# Patient Record
Sex: MALE | Race: WHITE | NOT HISPANIC OR LATINO | Employment: STUDENT | URBAN - METROPOLITAN AREA
[De-identification: names, ages, dates, MRNs, and addresses within clinical notes are randomized per-mention and may not be internally consistent; named-entity substitution may affect disease eponyms.]

---

## 2024-01-16 ENCOUNTER — TELEMEDICINE (OUTPATIENT)
Dept: BEHAVIORAL/MENTAL HEALTH CLINIC | Facility: CLINIC | Age: 19
End: 2024-01-16
Payer: COMMERCIAL

## 2024-01-16 DIAGNOSIS — F43.22 ADJUSTMENT DISORDER WITH ANXIOUS MOOD: Primary | ICD-10-CM

## 2024-01-16 PROCEDURE — 90834 PSYTX W PT 45 MINUTES: CPT | Performed by: COUNSELOR

## 2024-01-22 NOTE — PSYCH
"Behavioral Health Psychotherapy Progress Note    Psychotherapy Provided: Individual Psychotherapy     1. Adjustment disorder with anxious mood            Goals addressed in session: Goal 1     DATA: Client shared that their sleep schedule has been off. Client shared that they have continued to spend time with their parents, and has decided to go on a snowboarding/skiing day trip with their parents, which client's parents initiated. Therapist verbally reinforced client's decision to be open to building a stronger relationship with their parents. Client also shared that their family had had a big family game night.     During this session, this clinician used the following therapeutic modalities: Solution-Focused Therapy and Supportive Psychotherapy    Substance Abuse was not addressed during this session. If the client is diagnosed with a co-occurring substance use disorder, please indicate any changes in the frequency or amount of use: NA. Stage of change for addressing substance use diagnoses: No substance use/Not applicable    ASSESSMENT:  Tommy Colunga presents with a Euthymic/ normal mood.     his affect is Normal range and intensity, which is congruent, with his mood and the content of the session. The client has made progress on their goals.     Tommy Colunga presents with a none risk of suicide, none risk of self-harm, and none risk of harm to others.    For any risk assessment that surpasses a \"low\" rating, a safety plan must be developed.    A safety plan was indicated: no  If yes, describe in detail NA    PLAN: Between sessions, Tommy Colunga will use coping skills. At the next session, the therapist will use Family Therapy and Supportive Psychotherapy to address relationships.    Behavioral Health Treatment Plan and Discharge Planning: Tommy Colunga is aware of and agrees to continue to work on their treatment plan. They have identified and are working toward their discharge goals. yes    Visit start and stop " times:    01/16/24  Start Time: 1502  Stop Time: 1545  Total Visit Time: 43 minutes  Virtual Regular Visit    Verification of patient location:    Patient is located at Home in the following state in which I hold an active license [place]      Assessment/Plan:    Problem List Items Addressed This Visit       Adjustment disorder with anxious mood - Primary       Goals addressed in session: Goal 1          Reason for visit is No chief complaint on file.       Encounter provider JOVI Love    Provider located at [facility] THERAPIST [facility]  [facility] THERAPIST MENTAL HEALTH OUTPATIENT  [address] [facility] [place] [address]  [phone]      Recent Visits  Date Type Provider Dept   01/16/24 Telemedicine JOVI Love [facility] Therapist [facility]   Showing recent visits within past 7 days and meeting all other requirements  Future Appointments  No visits were found meeting these conditions.  Showing future appointments within next 150 days and meeting all other requirements       The patient was identified by name and date of birth. Tommy Colunga was informed that this is a telemedicine visit and that the visit is being conducted throughthe [employer] platform. He agrees to proceed..  My office door was closed. No one else was in the room.  He acknowledged consent and understanding of privacy and security of the video platform. The patient has agreed to participate and understands they can discontinue the visit at any time.    Patient is aware this is a billable service.     Subjective  Tommy Colunga is a 18 y.o. male  .      HPI     No past medical history on file.    No past surgical history on file.    No current outpatient medications on file.     No current facility-administered medications for this visit.        Not on File    Review of Systems    Video Exam    There were no vitals filed for this visit.    Physical Exam     01/16/24  Start Time: 1502  Stop Time: 1545  Total Visit Time: 43  minutes

## 2024-01-25 ENCOUNTER — TELEMEDICINE (OUTPATIENT)
Dept: BEHAVIORAL/MENTAL HEALTH CLINIC | Facility: CLINIC | Age: 19
End: 2024-01-25
Payer: COMMERCIAL

## 2024-01-25 DIAGNOSIS — F43.22 ADJUSTMENT DISORDER WITH ANXIOUS MOOD: Primary | ICD-10-CM

## 2024-01-25 PROCEDURE — 90832 PSYTX W PT 30 MINUTES: CPT | Performed by: COUNSELOR

## 2024-01-25 NOTE — PSYCH
Behavioral Health Psychotherapy Progress Note    Psychotherapy Provided: Individual Psychotherapy     1. Adjustment disorder with anxious mood            Goals addressed in session: Goal 1     DATA: Client shared that she had come out to her parents as a trans woman via a letter she had written several months ago but had been anxious to give it to them. Client shared that she gave the letter to her parents and then ran back to her room until they came to find her. Client said that her parents hugged her and then the 3 of them had a conversation about the letter. Client shared that she hid under her blanket during it, which client has done previously in therapy when she is discussing a very anxiety-producing topic. Therapist and client celebrated client's decision to tell her parents that she is a trans woman. Client then gave therapist permission to change her name on EP[employer] and to use she/her in therapist's notes. Client shared how her first day of classes went today, and how her time has been going since arriving back on campus. Therapist and client ended session slightly early due to client saying she had nothing further to talk about.    During this session, this clinician used the following therapeutic modalities: Family Therapy, Gender Affirmation Therapy, and Supportive Psychotherapy    Substance Abuse was not addressed during this session. If the client is diagnosed with a co-occurring substance use disorder, please indicate any changes in the frequency or amount of use: NA. Stage of change for addressing substance use diagnoses: No substance use/Not applicable    ASSESSMENT:  Eladia Colunga presents with a Euthymic/ normal mood.     her affect is Normal range and intensity, which is congruent, with her mood and the content of the session. The client has made progress on their goals.     Eladia Colunga presents with a none risk of suicide, none risk of self-harm, and none risk of harm to others.    For any risk  "assessment that surpasses a \"low\" rating, a safety plan must be developed.    A safety plan was indicated: no  If yes, describe in detail NA    PLAN: Between sessions, Eladia Colunga will continue to connect with parents while away at college. At the next session, the therapist will use Gender Affirmation Therapy to address identity.    Behavioral Health Treatment Plan and Discharge Planning: Eladia Colunga is aware of and agrees to continue to work on their treatment plan. They have identified and are working toward their discharge goals. yes    Visit start and stop times:    01/25/24  Start Time: 1202  Stop Time: 1232  Total Visit Time: 30 minutes  Virtual Regular Visit    Verification of patient location:    Patient is located at Other in the following state in which I hold an active license [place]      Assessment/Plan:    Problem List Items Addressed This Visit       Adjustment disorder with anxious mood - Primary       Goals addressed in session: Goal 1          Reason for visit is No chief complaint on file.       Encounter provider JOVI Love    Provider located at [facility] THERAPIST OP  [facility] THERAPIST MENTAL HEALTH OUTPATIENT  [address] [place] [id number] [phone]      Recent Visits  No visits were found meeting these conditions.  Showing recent visits within past 7 days and meeting all other requirements  Today's Visits  Date Type Provider Dept   01/25/24 Telemedicine JOVI Love [facility] Therapist [facility]   Showing today's visits and meeting all other requirements  Future Appointments  No visits were found meeting these conditions.  Showing future appointments within next 150 days and meeting all other requirements       The patient was identified by name and date of birth. Eladia Colunga was informed that this is a telemedicine visit and that the visit is being conducted throughthe [employer] platform. She agrees to proceed..  My office door was closed. " No one else was in the room.  She acknowledged consent and understanding of privacy and security of the video platform. The patient has agreed to participate and understands they can discontinue the visit at any time.    Patient is aware this is a billable service.     Subjective  Eladia Colunga is a 18 y.o. adult, client goes by Eladia and uses she/her pronouns .      HPI     No past medical history on file.    No past surgical history on file.    No current outpatient medications on file.     No current facility-administered medications for this visit.        Not on File    Review of Systems    Video Exam    There were no vitals filed for this visit.    Physical Exam     01/25/24  Start Time: 1202  Stop Time: 1232  Total Visit Time: 30 minutes

## 2024-01-30 PROBLEM — Z91.199 NO-SHOW FOR APPOINTMENT: Status: ACTIVE | Noted: 2024-01-30

## 2024-02-14 ENCOUNTER — TELEMEDICINE (OUTPATIENT)
Dept: BEHAVIORAL/MENTAL HEALTH CLINIC | Facility: CLINIC | Age: 19
End: 2024-02-14
Payer: COMMERCIAL

## 2024-02-14 DIAGNOSIS — F43.22 ADJUSTMENT DISORDER WITH ANXIOUS MOOD: Primary | ICD-10-CM

## 2024-02-14 PROCEDURE — 90832 PSYTX W PT 30 MINUTES: CPT | Performed by: COUNSELOR

## 2024-02-19 NOTE — PSYCH
"Behavioral Health Psychotherapy Progress Note    Psychotherapy Provided: Individual Psychotherapy     1. Adjustment disorder with anxious mood            Goals addressed in session: Goal 1     DATA: Therapist and client discussed the importance of confirming appointments. Therapist and client discussed how client's new classes are going. Client shared that 2 of her close friends recently ended their relationship, which has caused some difficulties for client. Client shared that she does not want to room with her current roommate again next year, but is not sure what her other options are. Client shared that her parents recently came to visit, which had gone well. Therapist and client continued to discuss and process client's relationship with her parents.    During this session, this clinician used the following therapeutic modalities: Cognitive Behavioral Therapy, Family Therapy, Solution-Focused Therapy, and Supportive Psychotherapy    Substance Abuse was not addressed during this session. If the client is diagnosed with a co-occurring substance use disorder, please indicate any changes in the frequency or amount of use: NA. Stage of change for addressing substance use diagnoses: No substance use/Not applicable    ASSESSMENT:  Eladia Colunga presents with a Euthymic/ normal mood.     her affect is Normal range and intensity, which is congruent, with her mood and the content of the session. The client has made progress on their goals.     Eladia Colunga presents with a none risk of suicide, none risk of self-harm, and none risk of harm to others.    For any risk assessment that surpasses a \"low\" rating, a safety plan must be developed.    A safety plan was indicated: no  If yes, describe in detail NA    PLAN: Between sessions, Eladia Colunga will use her coping skills. At the next session, the therapist will use Cognitive Behavioral Therapy to address anxiety.    Behavioral Health Treatment Plan and Discharge Planning: " Eladia Colunga is aware of and agrees to continue to work on their treatment plan. They have identified and are working toward their discharge goals. yes    Visit start and stop times:    02/14/24  Start Time: 1208  Stop Time: 1245  Total Visit Time: 37 minutes  Virtual Regular Visit    Verification of patient location:    Patient is located at Home in the following state in which I hold an active license [place]      Assessment/Plan:    Problem List Items Addressed This Visit       Adjustment disorder with anxious mood - Primary       Goals addressed in session: Goal 1          Reason for visit is No chief complaint on file.       Encounter provider JOVI Love    Provider located at [facility] THERAPIST [facility]OP  [facility] THERAPIST MENTAL HEALTH OUTPATIENT  [address] [facility] [address] [phone]      Recent Visits  Date Type Provider Dept   02/14/24 Telemedicine JOVI Love [facility] Therapist [facility]   Showing recent visits within past 7 days and meeting all other requirements  Future Appointments  No visits were found meeting these conditions.  Showing future appointments within next 150 days and meeting all other requirements       The patient was identified by name and date of birth. Eladia Colunga was informed that this is a telemedicine visit and that the visit is being conducted throughthe [employer] platform. She agrees to proceed..  My office door was closed. No one else was in the room.  She acknowledged consent and understanding of privacy and security of the video platform. The patient has agreed to participate and understands they can discontinue the visit at any time.    Patient is aware this is a billable service.     Subjective  Eladia Colunga is a 18 y.o. adult  .      HPI     No past medical history on file.    No past surgical history on file.    No current outpatient medications on file.     No current facility-administered medications for this visit.         Not on File    Review of Systems    Video Exam    There were no vitals filed for this visit.    Physical Exam     02/14/24  Start Time: 1208  Stop Time: 1245  Total Visit Time: 37 minutes

## 2024-02-27 ENCOUNTER — TELEMEDICINE (OUTPATIENT)
Dept: BEHAVIORAL/MENTAL HEALTH CLINIC | Facility: CLINIC | Age: 19
End: 2024-02-27
Payer: COMMERCIAL

## 2024-02-27 DIAGNOSIS — F43.22 ADJUSTMENT DISORDER WITH ANXIOUS MOOD: Primary | ICD-10-CM

## 2024-02-27 PROCEDURE — 90834 PSYTX W PT 45 MINUTES: CPT | Performed by: COUNSELOR

## 2024-02-27 NOTE — PSYCH
"Behavioral Health Psychotherapy Progress Note    Psychotherapy Provided: Individual Psychotherapy     1. Adjustment disorder with anxious mood            Goals addressed in session: Goal 1     DATA: Therapist and client updated client's treatment plan and safety plan. Client shared that she is not really friends with Ac anymore due to how they ended their relationship with Miguelito. When asked if client would like to discuss her concerns with Ac, client shared that she does not. Client shared that she has a crush on Iglesia, and is slowly working on getting to know her. Client expressed having anxiety with regards to asking Iglesia questions about herself. Client expressed a door knob confession of \"next time we should discuss schoolwork\" and then refused to say more. When therapist pointed out that this is called a door knob confession, client stated \"I know, that's why I did it!\"    During this session, this clinician used the following therapeutic modalities: Engagement Strategies, Cognitive Behavioral Therapy, Mindfulness-based Strategies, Solution-Focused Therapy, and Supportive Psychotherapy    Substance Abuse was not addressed during this session. If the client is diagnosed with a co-occurring substance use disorder, please indicate any changes in the frequency or amount of use: NA. Stage of change for addressing substance use diagnoses: No substance use/Not applicable    ASSESSMENT:  Eladia Colunga presents with a Anxious mood.     her affect is Normal range and intensity, which is congruent, with her mood and the content of the session. The client has made progress on their goals.     Eladia Colunga presents with a none risk of suicide, none risk of self-harm, and none risk of harm to others.    For any risk assessment that surpasses a \"low\" rating, a safety plan must be developed.    A safety plan was indicated: no  If yes, describe in detail NA    PLAN: Between sessions, Eladia Colunga will use her coping skills. At " the next session, the therapist will use Cognitive Behavioral Therapy to address anxiety.    Behavioral Health Treatment Plan and Discharge Planning: Eladia Colunga is aware of and agrees to continue to work on their treatment plan. They have identified and are working toward their discharge goals. yes    Visit start and stop times:    02/27/24  Start Time: 1402  Stop Time: 1449  Total Visit Time: 47 minutes  Virtual Regular Visit    Verification of patient location:    Patient is located at Other in the following state in which I hold an active license [place]      Assessment/Plan:    Problem List Items Addressed This Visit       Adjustment disorder with anxious mood - Primary       Goals addressed in session: Goal 1          Reason for visit is   Chief Complaint   Patient presents with    Virtual Regular Visit        Encounter provider JOVI Love    Provider located at [facility] THERAPIST OP  [facility] THERAPIST MENTAL HEALTH OUTPATIENT  [address] [facility] [address]  [phone]      Recent Visits  No visits were found meeting these conditions.  Showing recent visits within past 7 days and meeting all other requirements  Today's Visits  Date Type Provider Dept   02/27/24 Telemedicine JOVI Love [facility] Therapist op   Showing today's visits and meeting all other requirements  Future Appointments  No visits were found meeting these conditions.  Showing future appointments within next 150 days and meeting all other requirements       The patient was identified by name and date of birth. Eladia Colunga was informed that this is a telemedicine visit and that the visit is being conducted throughthe [employer] platform. She agrees to proceed..  My office door was closed. No one else was in the room.  She acknowledged consent and understanding of privacy and security of the video platform. The patient has agreed to participate and understands they can discontinue the  visit at any time.    Patient is aware this is a billable service.     Louisa Colunga is a 18 y.o. adult  .      HPI     No past medical history on file.    No past surgical history on file.    No current outpatient medications on file.     No current facility-administered medications for this visit.        Not on File    Review of Systems    Video Exam    There were no vitals filed for this visit.    Physical Exam     02/27/24  Start Time: 1402  Stop Time: 1449  Total Visit Time: 47 minutes

## 2024-02-27 NOTE — BH CRISIS PLAN
"Client Name: Eladia Colunga       Client YOB: 2005    RafaelYoshi Safety Plan      Creation Date: 2/27/24    Created By: JOVI Love       Step 1: Warning Signs:   Warning Signs   Not cleaning room   \"Messed up sleep schedule\"            Step 2: Internal Coping Strategies:   Internal Coping Strategies   long boarding   rock climbing            Step 3: People and social settings that provide distraction:   Name Contact Information   Friends at [employer]     Places   rock [place]   common area on dorm floor           Step 4: People whom I can ask for help during a crisis:      Name Contact Information    Sister in phone    Rexy in phone    Miguelito lives with      Step 5: Professionals or agencies I can contact during a crisis:      Clinican/Agency Name Phone Emergency Contact    Dorene Monge [phone]       Local Emergency Department Emergency Department Phone Emergency Department Address    [facility] [id number] [place], [place]        Crisis Phone Numbers:   Suicide Prevention Lifeline: Call or Text  287 Crisis Text Line: Text HOME to 742-914   Please note: Some [facility] do not have a separate number for Child/Adolescent specific crisis. If your county is not listed under Child/Adolescent, please call the adult number for your county      Adult Crisis Numbers: Child/Adolescent Crisis Numbers   [facility]: [phone] [facility]: [phone]   [facility]: [phone] [facility]: [phone]   [facility]: [phone] [place]: [phone]   [facility]: [phone] [place]: [phone]   [facility]: [phone]   [facility]: [phone]   [facility]: [phone]   [facility]: [phone] (daytime) [phone] (after hours, weekends, holidays)      Step 6: Making the environment safer (plan for lethal means safety):   Patient did not identify any lethal methods: Yes     Optional: What is most " important to me and worth living for?   Fully transitioning, dog, family, friends (Gina Fan)     Aleksandra Safety Plan. Marivel Hall and Antoni Belle. Used with permission of the authors.

## 2024-02-27 NOTE — BH TREATMENT PLAN
Outpatient Behavioral Health Psychotherapy Treatment Plan    Eladia Colunga  2005     Date of Initial Psychotherapy Assessment: 01/20/23  Date of Current Treatment Plan: 02/27/24  Treatment Plan Target Date: 08/26/24  Treatment Plan Expiration Date: 08/26/24    Diagnosis:   1. Adjustment disorder with anxious mood            Area(s) of Need: Anxiety    Long Term Goal 1 (in the client's own words): Lessen level of anxiety    Stage of Change: action    Target Date for completion: 08/26/24     Anticipated therapeutic modalities: Supportive therapy, CBT     People identified to complete this goal: Client, therapist      Objective 1: (identify the means of measuring success in meeting the objective): Develop a list of healthy coping skills, and use them 4 out of 5 times      Objective 2: (identify the means of measuring success in meeting the objective): Discuss ways to challenge and reframe anxious thoughts in 3 out of every 5 sessions       Long Term Goal 2 (in the client's own words): Determine whether a diagnosis of ADHD is appropriate for client    Stage of Change: action    Target Date for completion: 08/26/24     Anticipated therapeutic modalities: Supportive therapy, Psycho-education     People identified to complete this goal: Client, therapist      Objective 1: (identify the means of measuring success in meeting the objective): Discuss the symptoms of ADHD in therapy and determine if these symptoms apply to client     Long Term Goal 3 (in the client's own words): Client will lessen their sense of depression    Stage of Change: action    Target Date for completion: 08/26/24     Anticipated therapeutic modalities: Supportive therapy, Psycho-education, Coping skill developement     People identified to complete this goal: Client, therapist      Objective 1: (identify the means of measuring success in meeting the objective): Discuss client's triggers in 3 out of 5 sessions    I am currently under the care of a  [facility]'s psychiatric provider: no    My [facility] psychiatric provider is: NA    I am currently taking psychiatric medications:  NA    I feel that I will be ready for discharge from mental health care when I reach the following (measurable goal/objective): Meet goals    For children and adults who have a legal guardian:   Has there been any change to custody orders and/or guardianship status? NA. If yes, attach updated documentation.    I have created my Crisis Plan and have been offered a copy of this plan    Behavioral Health Treatment Plan [facility]: Diagnosis and Treatment Plan explained to Eladia Colunga acknowledges an understanding of their diagnosis. Eladia Colunga agrees to this treatment plan.    I have been offered a copy of this Treatment Plan. yes

## 2024-03-07 ENCOUNTER — TELEMEDICINE (OUTPATIENT)
Dept: BEHAVIORAL/MENTAL HEALTH CLINIC | Facility: CLINIC | Age: 19
End: 2024-03-07
Payer: COMMERCIAL

## 2024-03-07 DIAGNOSIS — F43.22 ADJUSTMENT DISORDER WITH ANXIOUS MOOD: Primary | ICD-10-CM

## 2024-03-07 PROCEDURE — 90834 PSYTX W PT 45 MINUTES: CPT | Performed by: COUNSELOR

## 2024-03-11 NOTE — PSYCH
"Behavioral Health Psychotherapy Progress Note    Psychotherapy Provided: Individual Psychotherapy     1. Adjustment disorder with anxious mood            Goals addressed in session: Goal 1     DATA: Client shared that she has been working on improving upon her sleep schedule. Client shared that she continues to have feelings for Iglesia, and has been working on continuing to get to know Iglesia. Therapist and client continued to discuss how client's anxiety continues to impact her.    During this session, this clinician used the following therapeutic modalities: Engagement Strategies, Solution-Focused Therapy, and Supportive Psychotherapy    Substance Abuse was not addressed during this session. If the client is diagnosed with a co-occurring substance use disorder, please indicate any changes in the frequency or amount of use: NA. Stage of change for addressing substance use diagnoses: No substance use/Not applicable    ASSESSMENT:  Eladia Colunga presents with a Euthymic/ normal mood.     her affect is Normal range and intensity, which is congruent, with her mood and the content of the session. The client has made progress on their goals.     Eladia Colunga presents with a none risk of suicide, none risk of self-harm, and none risk of harm to others.    For any risk assessment that surpasses a \"low\" rating, a safety plan must be developed.    A safety plan was indicated: no  If yes, describe in detail NA    PLAN: Between sessions, Eladia Colunga will use her coping skills. At the next session, the therapist will use Cognitive Behavioral Therapy to address anxiety.    Behavioral Health Treatment Plan and Discharge Planning: Eladia Colunga is aware of and agrees to continue to work on their treatment plan. They have identified and are working toward their discharge goals. yes    Visit start and stop times:    03/07/24  Start Time: 1100  Stop Time: 1142  Total Visit Time: 42 minutes  Virtual Regular Visit    Verification of patient " location:    Patient is located at Home in the following state in which I hold an active license [place]      Assessment/Plan:    Problem List Items Addressed This Visit       Adjustment disorder with anxious mood - Primary       Goals addressed in session: Goal 1          Reason for visit is No chief complaint on file.       Encounter provider JOVI Love    Provider located at [facility] TH[facility]  [facility] THERAPIST [facility]ENT[facility] OUTPATIENT  [address]  [facility] [place] [address] [phone]      Recent Visits  Date Type Provider Dept   03/07/24 Telemedicine JOVI Love [facility] Therapist [facility]   Showing recent visits within past 7 days and meeting all other requirements  Future Appointments  No visits were found meeting these conditions.  Showing future appointments within next 150 days and meeting all other requirements       The patient was identified by name and date of birth. Eladia Colunga was informed that this is a telemedicine visit and that the visit is being conducted throughthe Epic Embedded platform. She agrees to proceed..  My office door was closed. No one else was in the room.  She acknowledged consent and understanding of privacy and security of the video platform. The patient has agreed to participate and understands they can discontinue the visit at any time.    Patient is aware this is a billable service.     Subjective  Eladia Colunga is a 18 y.o. adult  .      HPI     No past medical history on file.    No past surgical history on file.    No current outpatient medications on file.     No current facility-administered medications for this visit.        Not on File    Review of Systems    Video Exam    There were no vitals filed for this visit.    Physical Exam     03/07/24  Start Time: 1100  Stop Time: 1142  Total Visit Time: 42 minutes

## 2024-03-13 ENCOUNTER — TELEMEDICINE (OUTPATIENT)
Dept: BEHAVIORAL/MENTAL HEALTH CLINIC | Facility: CLINIC | Age: 19
End: 2024-03-13
Payer: COMMERCIAL

## 2024-03-13 DIAGNOSIS — F43.22 ADJUSTMENT DISORDER WITH ANXIOUS MOOD: Primary | ICD-10-CM

## 2024-03-13 PROCEDURE — 90834 PSYTX W PT 45 MINUTES: CPT | Performed by: COUNSELOR

## 2024-03-19 NOTE — PSYCH
"Behavioral Health Psychotherapy Progress Note    Psychotherapy Provided: Individual Psychotherapy     1. Adjustment disorder with anxious mood            Goals addressed in session: Goal 1 and goal 3    DATA: Therapist and client continued to discuss and process client's gender identity, and client's anxiety about wearing feminine clothes due to the fear of the reaction of others. Client shared that when she experiences gender dysphoria, that that leads to passive SI. Client denies having any active or current SI or SIB. Client denied any plan, intent, or means to harm herself. Client contracted for safety. Therapist and client discussed client's anxiety regarding her grades, and how that also can lead to passive SI.    During this session, this clinician used the following therapeutic modalities: Cognitive Behavioral Therapy, Gender Affirmation Therapy, Mindfulness-based Strategies, Solution-Focused Therapy, and Supportive Psychotherapy    Substance Abuse was not addressed during this session. If the client is diagnosed with a co-occurring substance use disorder, please indicate any changes in the frequency or amount of use: NA. Stage of change for addressing substance use diagnoses: No substance use/Not applicable    ASSESSMENT:  Eladia Colunga presents with a Euthymic/ normal and Anxious mood.     her affect is Normal range and intensity, which is congruent, with her mood and the content of the session. The client has made progress on their goals.     Eladia Colunga presents with a none risk of suicide, none risk of self-harm, and none risk of harm to others.    For any risk assessment that surpasses a \"low\" rating, a safety plan must be developed.    A safety plan was indicated: no  If yes, describe in detail NA    PLAN: Between sessions, Eladia Colunga will use her coping skills. At the next session, the therapist will use Cognitive Behavioral Therapy to address anxious thoughts.    Behavioral Health Treatment Plan and " Discharge Planning: Eladia Colunga is aware of and agrees to continue to work on their treatment plan. They have identified and are working toward their discharge goals. yes    Visit start and stop times:    03/13/24  Start Time: 1401  Stop Time: 1452  Total Visit Time: 51 minutes  Virtual Regular Visit    Verification of patient location:    Patient is located at Other in the following state in which I hold an active license [place]      Assessment/Plan:    Problem List Items Addressed This Visit       Adjustment disorder with anxious mood - Primary       Goals addressed in session: Goal 1 and Goal 3           Reason for visit is No chief complaint on file.       Encounter provider JOVI Love    Provider located at [facility] THERAPIST [facility]  [facility] THERAPIST MENTAL HEALTH OUTPATIENT  [address] [facility] [address] [phone]      Recent Visits  Date Type Provider Dept   03/13/24 Telemedicine JOVI Love [facility] Therapist [facility]   Showing recent visits within past 7 days and meeting all other requirements  Future Appointments  No visits were found meeting these conditions.  Showing future appointments within next 150 days and meeting all other requirements       The patient was identified by name and date of birth. Eladia Colunga was informed that this is a telemedicine visit and that the visit is being conducted throughthe Epic Embedded platform. She agrees to proceed..  My office door was closed. No one else was in the room.  She acknowledged consent and understanding of privacy and security of the video platform. The patient has agreed to participate and understands they can discontinue the visit at any time.    Patient is aware this is a billable service.     Subjective  Eladia Colunga is a 18 y.o. adult  .      HPI     No past medical history on file.    No past surgical history on file.    No current outpatient medications on file.     No current facility-administered  medications for this visit.        Not on File    Review of Systems    Video Exam    There were no vitals filed for this visit.    Physical Exam     03/13/24  Start Time: 1401  Stop Time: 1452  Total Visit Time: 51 minutes

## 2024-03-27 ENCOUNTER — TELEMEDICINE (OUTPATIENT)
Dept: BEHAVIORAL/MENTAL HEALTH CLINIC | Facility: CLINIC | Age: 19
End: 2024-03-27
Payer: COMMERCIAL

## 2024-03-27 DIAGNOSIS — F43.22 ADJUSTMENT DISORDER WITH ANXIOUS MOOD: Primary | ICD-10-CM

## 2024-03-27 PROCEDURE — 90834 PSYTX W PT 45 MINUTES: CPT | Performed by: COUNSELOR

## 2024-04-01 NOTE — PSYCH
"Behavioral Health Psychotherapy Progress Note    Psychotherapy Provided: Individual Psychotherapy     1. Adjustment disorder with anxious mood            Goals addressed in session: Goal 1     DATA: Therapist and client continued to discuss and process client's social relationships and interactions. Client focused on her relationship and interactions with her peer, Iglesia, throughout session. Client has made some progress with putting herself out there in order to learn more about Iglesia and in order to get to know her better, which therapist verbally reinforced. Client was giggling throughout session while discussing Iglesia.    During this session, this clinician used the following therapeutic modalities: Solution-Focused Therapy and Supportive Psychotherapy    Substance Abuse was not addressed during this session. If the client is diagnosed with a co-occurring substance use disorder, please indicate any changes in the frequency or amount of use: NA. Stage of change for addressing substance use diagnoses: No substance use/Not applicable    ASSESSMENT:  Eladia Colunga presents with a Euthymic/ normal mood.     her affect is Normal range and intensity, which is congruent, with her mood and the content of the session. The client has made progress on their goals.     Eladia Colunga presents with a none risk of suicide, none risk of self-harm, and none risk of harm to others.    For any risk assessment that surpasses a \"low\" rating, a safety plan must be developed.    A safety plan was indicated: no  If yes, describe in detail NA    PLAN: Between sessions, Eladia Colunga will use coping skills. At the next session, the therapist will use Gender Affirmation Therapy to address explore client's experiences.    Behavioral Health Treatment Plan and Discharge Planning: Eladia Colunga is aware of and agrees to continue to work on their treatment plan. They have identified and are working toward their discharge goals. yes    Visit start " and stop times:    03/27/24  Start Time: 1403  Stop Time: 1447  Total Visit Time: 44 minutes  Virtual Regular Visit    Verification of patient location:    Patient is located at Other in the following state in which I hold an active license [place]      Assessment/Plan:    Problem List Items Addressed This Visit       Adjustment disorder with anxious mood - Primary       Goals addressed in session: Goal 1          Reason for visit is No chief complaint on file.       Encounter provider JOVI Love    Provider located at [facility] THERAPIST [facility]  [facility] THERAPIST MENTAL HEALTH OUTPATIENT  [address] [facility] [place] [address]  [phone]      Recent Visits  Date Type Provider Dept   03/27/24 Telemedicine JOVI Love [facility] Therapist [facility]   Showing recent visits within past 7 days and meeting all other requirements  Future Appointments  No visits were found meeting these conditions.  Showing future appointments within next 150 days and meeting all other requirements       The patient was identified by name and date of birth. Eladia Colunga was informed that this is a telemedicine visit and that the visit is being conducted throughthe [employer] platform. She agrees to proceed..  My office door was closed. No one else was in the room.  She acknowledged consent and understanding of privacy and security of the video platform. The patient has agreed to participate and understands they can discontinue the visit at any time.    Patient is aware this is a billable service.     Subjective  Eladia Colunga is a 18 y.o. adult  .      HPI     No past medical history on file.    No past surgical history on file.    No current outpatient medications on file.     No current facility-administered medications for this visit.        Not on File    Review of Systems    Video Exam    There were no vitals filed for this visit.    Physical Exam     03/27/24  Start Time: 1403  Stop Time: 1447  Total  Visit Time: 44 minutes

## 2024-04-10 ENCOUNTER — TELEMEDICINE (OUTPATIENT)
Dept: BEHAVIORAL/MENTAL HEALTH CLINIC | Facility: CLINIC | Age: 19
End: 2024-04-10
Payer: COMMERCIAL

## 2024-04-10 DIAGNOSIS — F43.22 ADJUSTMENT DISORDER WITH ANXIOUS MOOD: Primary | ICD-10-CM

## 2024-04-10 PROCEDURE — 90834 PSYTX W PT 45 MINUTES: CPT | Performed by: COUNSELOR

## 2024-04-15 NOTE — PSYCH
Behavioral Health Psychotherapy Progress Note    Psychotherapy Provided: Individual Psychotherapy     1. Adjustment disorder with anxious mood            Goals addressed in session: Goal 1     DATA: Client shared that she had recently broken her collar bone after falling off of her long board. Client continued to explore her interactions with Iglesia, a peer at college. Client shared that Iglesia had dressed her up in several feminine outfits and had done her makeup, and showed therapist photos. Therapist and client explored what that had felt like to client. Client also shared that she had worn feminine clothes to climb the rock wall, which therapist verbally reinforced. Client also shared that she had spoken with her mother about needing help to pay for hormone treatment, and that her mother had agreed to help her. Therapist verbally reinforced client speaking to her mother about needing assistance. Client shared that she has reached out to her professors to inquire about the ability to make up missing assignments, and that she has been working on work that she is allowed to complete, which therapist verbally reinforced.    During this session, this clinician used the following therapeutic modalities: Cognitive Behavioral Therapy, Family Therapy, Gender Affirmation Therapy, Mindfulness-based Strategies, Solution-Focused Therapy, and Supportive Psychotherapy    Substance Abuse was not addressed during this session. If the client is diagnosed with a co-occurring substance use disorder, please indicate any changes in the frequency or amount of use: NA. Stage of change for addressing substance use diagnoses: No substance use/Not applicable    ASSESSMENT:  Eladia Colunga presents with a Euthymic/ normal mood.     her affect is Normal range and intensity, which is congruent, with her mood and the content of the session. The client has made progress on their goals.     Eladia Colunga presents with a none risk of suicide, none  "risk of self-harm, and none risk of harm to others.    For any risk assessment that surpasses a \"low\" rating, a safety plan must be developed.    A safety plan was indicated: no  If yes, describe in detail NA    PLAN: Between sessions, Eladia Colunga will work on completing missing assignments. At the next session, the therapist will use Gender Affirmation Therapy to address identity.    Behavioral Health Treatment Plan and Discharge Planning: Eladia Colunag is aware of and agrees to continue to work on their treatment plan. They have identified and are working toward their discharge goals. yes    Visit start and stop times:    04/10/24  Start Time: 1403  Stop Time: 1446  Total Visit Time: 43 minutes  Virtual Regular Visit    Verification of patient location:    Patient is located at Other in the following state in which I hold an active license [place]      Assessment/Plan:    Problem List Items Addressed This Visit       Adjustment disorder with anxious mood - Primary       Goals addressed in session: Goal 1          Reason for visit is No chief complaint on file.       Encounter provider JOVI Love    Provider located at [facility] THERAPIST [facility]  [facility] THERAPIST MENTAL HEALTH OUTPATIENT  [address] [place] [address]  [phone]      Recent Visits  Date Type Provider Dept   04/10/24 Telemedicine JOVI Love [facility] Therapist [facility]   Showing recent visits within past 7 days and meeting all other requirements  Future Appointments  No visits were found meeting these conditions.  Showing future appointments within next 150 days and meeting all other requirements       The patient was identified by name and date of birth. Eladia Colunga was informed that this is a telemedicine visit and that the visit is being conducted throughthe Epic Embedded platform. She agrees to proceed..  My office door was closed. No one else was in the room.  She acknowledged consent and " understanding of privacy and security of the video platform. The patient has agreed to participate and understands they can discontinue the visit at any time.    Patient is aware this is a billable service.     Louisa Colunga is a 18 y.o. adult  .      HPI     No past medical history on file.    No past surgical history on file.    No current outpatient medications on file.     No current facility-administered medications for this visit.        Not on File    Review of Systems    Video Exam    There were no vitals filed for this visit.    Physical Exam     04/10/24  Start Time: 1403  Stop Time: 1446  Total Visit Time: 43 minutes

## 2024-04-24 ENCOUNTER — TELEMEDICINE (OUTPATIENT)
Dept: BEHAVIORAL/MENTAL HEALTH CLINIC | Facility: CLINIC | Age: 19
End: 2024-04-24
Payer: COMMERCIAL

## 2024-04-24 DIAGNOSIS — F43.22 ADJUSTMENT DISORDER WITH ANXIOUS MOOD: Primary | ICD-10-CM

## 2024-04-24 PROCEDURE — 90834 PSYTX W PT 45 MINUTES: CPT | Performed by: COUNSELOR

## 2024-04-29 NOTE — PSYCH
"Behavioral Health Psychotherapy Progress Note    Psychotherapy Provided: Individual Psychotherapy     1. Adjustment disorder with anxious mood            Goals addressed in session: Goal 3      DATA: Client expressed disappointment at discovering that Iglesia and Chao are dating. Client shared that she was struggling more after first finding out, but that her mood has been improving. Client expressed experiencing SI, and categorized it as \"passive SI,\" and shared that she does not want to act on the SI. Client expressed that she does not want to die. Client denied any plan, intent, or means to harm herself. Client contracted for safety. Client expressed that it is difficult for her to discuss her feelings of depression. Therapist and client discussed the impact that client's recent major life transitions, such as college and expressing her trans identity, have had on her mental health.     During this session, this clinician used the following therapeutic modalities: Cognitive Behavioral Therapy, Gender Affirmation Therapy, Mindfulness-based Strategies, Solution-Focused Therapy, and Supportive Psychotherapy    Substance Abuse was not addressed during this session. If the client is diagnosed with a co-occurring substance use disorder, please indicate any changes in the frequency or amount of use: NA. Stage of change for addressing substance use diagnoses: No substance use/Not applicable    ASSESSMENT:  Eladia Colunga presents with a Euthymic/ normal mood.     her affect is Normal range and intensity, which is congruent, with her mood and the content of the session. The client has made progress on their goals.     Eladai Colunga presents with a minimal risk of suicide, none risk of self-harm, and none risk of harm to others.    For any risk assessment that surpasses a \"low\" rating, a safety plan must be developed.    A safety plan was indicated: no  If yes, describe in detail NA    PLAN: Between sessions, Eladia Colunga will " use her coping skills. At the next session, the therapist will use Mindfulness-based Strategies, Solution-Focused Therapy, and Supportive Psychotherapy to address coping skill development.    Behavioral Health Treatment Plan and Discharge Planning: Eladia Colunga is aware of and agrees to continue to work on their treatment plan. They have identified and are working toward their discharge goals. yes    Visit start and stop times:    04/24/24  Start Time: 1402  Stop Time: 1450  Total Visit Time: 48 minutes  Virtual Regular Visit    Verification of patient location:    Patient is located at Other in the following state in which I hold an active license [place]      Assessment/Plan:    Problem List Items Addressed This Visit       Adjustment disorder with anxious mood - Primary       Goals addressed in session: Goal 3           Reason for visit is No chief complaint on file.       Encounter provider JOVI Love      Recent Visits  Date Type Provider Dept   04/24/24 Telemedicine JOVI Love [facility] Therapist [facility]   Showing recent visits within past 7 days and meeting all other requirements  Future Appointments  No visits were found meeting these conditions.  Showing future appointments within next 150 days and meeting all other requirements       The patient was identified by name and date of birth. Eladia Colunga was informed that this is a telemedicine visit and that the visit is being conducted throughthe Epic Embedded platform. She agrees to proceed..  My office door was closed. No one else was in the room.  She acknowledged consent and understanding of privacy and security of the video platform. The patient has agreed to participate and understands they can discontinue the visit at any time.    Patient is aware this is a billable service.     Subjective  Eladia Colunga is a 18 y.o. adult  .      HPI     No past medical history on file.    No past surgical history on file.    No current outpatient medications  on file.     No current facility-administered medications for this visit.        Not on File    Review of Systems    Video Exam    There were no vitals filed for this visit.    Physical Exam     04/24/24  Start Time: 1402  Stop Time: 1450  Total Visit Time: 48 minutes

## 2024-05-03 ENCOUNTER — TELEMEDICINE (OUTPATIENT)
Dept: BEHAVIORAL/MENTAL HEALTH CLINIC | Facility: CLINIC | Age: 19
End: 2024-05-03
Payer: COMMERCIAL

## 2024-05-03 DIAGNOSIS — F43.22 ADJUSTMENT DISORDER WITH ANXIOUS MOOD: Primary | ICD-10-CM

## 2024-05-03 PROCEDURE — 90832 PSYTX W PT 30 MINUTES: CPT | Performed by: COUNSELOR

## 2024-05-03 NOTE — PSYCH
Behavioral Health Psychotherapy Progress Note    Psychotherapy Provided: Individual Psychotherapy     1. Adjustment disorder with anxious mood            Goals addressed in session: Goal 3      DATA: Therapist provided psycho-education on anxiety, depression, and ADHD, due to client's questions. We discussed that client likely did not meet criteria for a diagnosis of anxiety because it is not reported to be pervasive and to cause a significant disturbance in the major areas of client's life. Client acknowledged experiencing passive SI, and that she pictures shooting herself with a gun, and that that helps with any urges. However, client denied any plan, intent, or means to harm herself. Client also stated that she does not want to kill herself. Client expressed that she does have a hx of engaging in SIB via punching herself in her thigh, and that recently she rubbed her knuckles against a fence so that they bruised when she was upset. Client denied any plan or intent to cause herself harm. It was notably difficult for client to share information about her passive SI and SIB with therapist, and therapist verbally reinforced client sharing with her.     During this session, this clinician used the following therapeutic modalities: Engagement Strategies, Cognitive Behavioral Therapy, Gender Affirmation Therapy, Mindfulness-based Strategies, Solution-Focused Therapy, and Supportive Psychotherapy    Substance Abuse was not addressed during this session. If the client is diagnosed with a co-occurring substance use disorder, please indicate any changes in the frequency or amount of use: NA. Stage of change for addressing substance use diagnoses: No substance use/Not applicable    ASSESSMENT:  Eladia Colunga presents with a Anxious mood.     her affect is Normal range and intensity, which is congruent, with her mood and the content of the session. The client has made progress on their goals.     Eladia Colunga presents with a  "minimal risk of suicide, minimal risk of self-harm, and none risk of harm to others.    For any risk assessment that surpasses a \"low\" rating, a safety plan must be developed.    A safety plan was indicated: no  If yes, describe in detail NA    PLAN: Between sessions, Eladia Colunga will use her coping skills. At the next session, the therapist will use Cognitive Behavioral Therapy to address problematic thoughts.    Behavioral Health Treatment Plan and Discharge Planning: Eladia Colunga is aware of and agrees to continue to work on their treatment plan. They have identified and are working toward their discharge goals. yes    Visit start and stop times:    05/03/24  Start Time: 0912  Stop Time: 0949  Total Visit Time: 37 minutes  Virtual Regular Visit    Verification of patient location:    Patient is located at Other in the following state in which I hold an active license [place]      Assessment/Plan:    Problem List Items Addressed This Visit       Adjustment disorder with anxious mood - Primary       Goals addressed in session: Goal 3           Reason for visit is No chief complaint on file.       Encounter provider JOVI Love      Recent Visits  No visits were found meeting these conditions.  Showing recent visits within past 7 days and meeting all other requirements  Today's Visits  Date Type Provider Dept   05/03/24 Telemedicine JOVI Love [facility] Therapist [facility]   Showing today's visits and meeting all other requirements  Future Appointments  No visits were found meeting these conditions.  Showing future appointments within next 150 days and meeting all other requirements       The patient was identified by name and date of birth. Eladia Colunga was informed that this is a telemedicine visit and that the visit is being conducted throughthe Epic Embedded platform. She agrees to proceed..  My office door was closed. No one else was in the room.  She acknowledged consent and understanding of privacy " and security of the video platform. The patient has agreed to participate and understands they can discontinue the visit at any time.    Patient is aware this is a billable service.     Louisa Colunga is a 18 y.o. adult  .      HPI     No past medical history on file.    No past surgical history on file.    No current outpatient medications on file.     No current facility-administered medications for this visit.        Not on File    Review of Systems    Video Exam    There were no vitals filed for this visit.    Physical Exam     05/03/24  Start Time: 0912  Stop Time: 0949  Total Visit Time: 37 minutes

## 2024-05-08 ENCOUNTER — TELEMEDICINE (OUTPATIENT)
Dept: BEHAVIORAL/MENTAL HEALTH CLINIC | Facility: CLINIC | Age: 19
End: 2024-05-08
Payer: COMMERCIAL

## 2024-05-08 DIAGNOSIS — F43.22 ADJUSTMENT DISORDER WITH ANXIOUS MOOD: Primary | ICD-10-CM

## 2024-05-08 PROCEDURE — 90834 PSYTX W PT 45 MINUTES: CPT | Performed by: COUNSELOR

## 2024-05-08 NOTE — PSYCH
"Behavioral Health Psychotherapy Progress Note    Psychotherapy Provided: Individual Psychotherapy     1. Adjustment disorder with anxious mood            Goals addressed in session: Goal 3      DATA: Therapist and client discussed the ending of client's freshman year of college. Client shared that tomorrow is her last final, as well as her last day on campus as a freshman. Client shared that she is upset about this, and that she does not want her school year to end. Client shared that she is leaving all of her packing until tomorrow morning when her parents arrive to help her to pack, and then take her home after her final exam. Client did not want to discuss what she wants to do over the summer beyond finding a job, but said that she will deal with that next week once she is home. Client began to share some information about her history of bullying while she was growing up.    During this session, this clinician used the following therapeutic modalities: Engagement Strategies, Solution-Focused Therapy, and Supportive Psychotherapy    Substance Abuse was not addressed during this session. If the client is diagnosed with a co-occurring substance use disorder, please indicate any changes in the frequency or amount of use: NA. Stage of change for addressing substance use diagnoses: No substance use/Not applicable    ASSESSMENT:  Eladia Colunga presents with a Euthymic/ normal and Anxious mood.     her affect is Normal range and intensity, which is congruent, with her mood and the content of the session. The client has made progress on their goals.     Eladia Colunga presents with a none risk of suicide, none risk of self-harm, and none risk of harm to others.    For any risk assessment that surpasses a \"low\" rating, a safety plan must be developed.    A safety plan was indicated: no  If yes, describe in detail NA    PLAN: Between sessions, Eladia Colunga will use her coping skills. At the next session, the therapist will use " Solution-Focused Therapy and Supportive Psychotherapy to address how client wants to spend her time over summer.    Behavioral Health Treatment Plan and Discharge Planning: Eladia Colunga is aware of and agrees to continue to work on their treatment plan. They have identified and are working toward their discharge goals. yes    Visit start and stop times:    05/08/24  Start Time: 1403  Stop Time: 1448  Total Visit Time: 45 minutes  Virtual Regular Visit    Verification of patient location:    Patient is located at Other in the following state in which I hold an active license [place]      Assessment/Plan:    Problem List Items Addressed This Visit       Adjustment disorder with anxious mood - Primary       Goals addressed in session: Goal 3           Reason for visit is No chief complaint on file.       Encounter provider JOVI Love      Recent Visits  Date Type Provider Dept   05/03/24 Telemedicine JOVI Love [facility] Therapist [facility]   Showing recent visits within past 7 days and meeting all other requirements  Today's Visits  Date Type Provider Dept   05/08/24 Telemedicine JOVI Love [facility] Therapist [facility]   Showing today's visits and meeting all other requirements  Future Appointments  No visits were found meeting these conditions.  Showing future appointments within next 150 days and meeting all other requirements       The patient was identified by name and date of birth. Eladia Colunga was informed that this is a telemedicine visit and that the visit is being conducted throughthe Epic Embedded platform. She agrees to proceed..  My office door was closed. No one else was in the room.  She acknowledged consent and understanding of privacy and security of the video platform. The patient has agreed to participate and understands they can discontinue the visit at any time.    Patient is aware this is a billable service.     Subjective  Eladia Colunga is a 18 y.o. adult  .      HPI     No  past medical history on file.    No past surgical history on file.    No current outpatient medications on file.     No current facility-administered medications for this visit.        Not on File    Review of Systems    Video Exam    There were no vitals filed for this visit.    Physical Exam     05/08/24  Start Time: 1403  Stop Time: 1448  Total Visit Time: 45 minutes

## 2024-05-14 ENCOUNTER — SOCIAL WORK (OUTPATIENT)
Dept: BEHAVIORAL/MENTAL HEALTH CLINIC | Facility: CLINIC | Age: 19
End: 2024-05-14
Payer: COMMERCIAL

## 2024-05-14 DIAGNOSIS — F43.23 ADJUSTMENT DISORDER WITH MIXED ANXIETY AND DEPRESSED MOOD: Primary | ICD-10-CM

## 2024-05-14 PROCEDURE — 90834 PSYTX W PT 45 MINUTES: CPT | Performed by: COUNSELOR

## 2024-05-22 ENCOUNTER — SOCIAL WORK (OUTPATIENT)
Dept: BEHAVIORAL/MENTAL HEALTH CLINIC | Facility: CLINIC | Age: 19
End: 2024-05-22
Payer: COMMERCIAL

## 2024-05-22 DIAGNOSIS — F43.23 ADJUSTMENT DISORDER WITH MIXED ANXIETY AND DEPRESSED MOOD: Primary | ICD-10-CM

## 2024-05-22 PROCEDURE — 90834 PSYTX W PT 45 MINUTES: CPT | Performed by: COUNSELOR

## 2024-05-28 ENCOUNTER — SOCIAL WORK (OUTPATIENT)
Dept: BEHAVIORAL/MENTAL HEALTH CLINIC | Facility: CLINIC | Age: 19
End: 2024-05-28
Payer: COMMERCIAL

## 2024-05-28 DIAGNOSIS — F43.23 ADJUSTMENT DISORDER WITH MIXED ANXIETY AND DEPRESSED MOOD: Primary | ICD-10-CM

## 2024-05-28 PROBLEM — H35.071: Status: ACTIVE | Noted: 2018-03-19

## 2024-05-28 PROBLEM — Z97.3 WEARS GLASSES: Status: ACTIVE | Noted: 2018-04-11

## 2024-05-28 PROBLEM — S42.022A CLOSED DISPLACED FRACTURE OF SHAFT OF LEFT CLAVICLE: Status: ACTIVE | Noted: 2024-04-11

## 2024-05-28 PROBLEM — Z13.220 SCREENING CHOLESTEROL LEVEL: Status: ACTIVE | Noted: 2017-01-13

## 2024-05-28 PROBLEM — Z86.19 HISTORY OF VARICELLA AS A CHILD: Status: ACTIVE | Noted: 2022-07-29

## 2024-05-28 PROBLEM — D22.9 NEVUS: Status: ACTIVE | Noted: 2019-05-13

## 2024-05-28 PROCEDURE — 90834 PSYTX W PT 45 MINUTES: CPT | Performed by: COUNSELOR

## 2024-05-28 RX ORDER — SPIRONOLACTONE 50 MG/1
50 TABLET, FILM COATED ORAL
COMMUNITY

## 2024-05-28 RX ORDER — IBUPROFEN 200 MG
1 TABLET ORAL EVERY 6 HOURS PRN
COMMUNITY
End: 2024-06-05 | Stop reason: ALTCHOICE

## 2024-05-28 RX ORDER — PROGESTERONE 100 MG/1
100 CAPSULE ORAL
COMMUNITY

## 2024-05-28 RX ORDER — ESTRADIOL 2 MG/1
2 TABLET ORAL
COMMUNITY

## 2024-05-28 NOTE — PSYCH
"Behavioral Health Psychotherapy Progress Note    Psychotherapy Provided: Individual Psychotherapy     1. Adjustment disorder with mixed anxiety and depressed mood            Goals addressed in session: Goal 1     DATA: Therapist and client continued to explore client's mental health. Client has not been interacting much with her friends from college. Therapist encouraged client to continue to reach out to her friends, both from college and from home, so that she does not self-isolate. Therapist and client also discussed client's gender identity and the impact on her mental health. Client shared that her parents ask her before meeting with extended family, how she would like to be addressed in front of the rest of the family. Client has not yet shared her gender identity with her extended family. Therapist and client discussed the impact that being dead-named and misgendered has on client's mental health.     During this session, this clinician used the following therapeutic modalities: Cognitive Behavioral Therapy, Gender Affirmation Therapy, Solution-Focused Therapy, and Supportive Psychotherapy    Substance Abuse was not addressed during this session. If the client is diagnosed with a co-occurring substance use disorder, please indicate any changes in the frequency or amount of use: NA. Stage of change for addressing substance use diagnoses: No substance use/Not applicable    ASSESSMENT:  Eladia Colunga presents with a Euthymic/ normal mood.     her affect is Normal range and intensity, which is congruent, with her mood and the content of the session. The client has made progress on their goals.     Eladia Colunga presents with a none risk of suicide, none risk of self-harm, and none risk of harm to others.    For any risk assessment that surpasses a \"low\" rating, a safety plan must be developed.    A safety plan was indicated: no  If yes, describe in detail NA    PLAN: Between sessions, Eladia Colunga will use her coping " skills. At the next session, the therapist will use Gender Affirmation Therapy to address impact on mental health.    Behavioral Health Treatment Plan and Discharge Planning: Eladia Colunga is aware of and agrees to continue to work on their treatment plan. They have identified and are working toward their discharge goals. yes    Visit start and stop times:    05/22/24  Start Time: 1402  Stop Time: 1452  Total Visit Time: 50 minutes

## 2024-05-28 NOTE — PSYCH
"PSYCHIATRIC EVALUATION     [facility]    This note was not shared with the patient due to reasonable likelihood of causing patient harm         Name and Date of Birth:  Eladia Colunga 18 y.o. 2005    Date of Visit: 6/5/2024    Reason for visit: Establish care for medication management    HPI     Eladia is a 18 y.o. adult  (prefers pronouns She/Her) with a history of Adjustment Disorder, with mixed anxiety and depressed mood, who presents for psychiatric evaluation due to the therapists referral.  No psychotropic medications currently prescribed. Eladia is currently connected to outpatient therapy with Dorene Monge at [facility]. No additional services at this time. Eladia was personally seen and evaluated today at the [facility] outpatient clinic for an initial psychiatric evaluation for medication management.        Eladia presents to the appointment today visibly anxious and using a fidget during the interview. She reports her mood today is \"anxious\". States her sleep is adequate and averages 7 to 8 hours of sleep per night. She utilizes melatonin 10 mg which is beneficial. She states she often goes to bed late as she has friends that live on the [facility]. Eladia states her energy levels and motivation are \"less than average\".  Patient reports adequate appetite, sometimes only eating 2 meals per day.  Endorses emotional eating at times, however denies disordered eating.  She reports she just started working part-time at [employer] and at the [employer] room which she really enjoys.    Eladia endorses anxiety and states she has \"always been anxious\". She rates her anxiety today a 7-8/10 on the severity scale. She reports that she was bullied in elementary school.  She states her anxiety appears to be more situational for example, in social situations.  She feels her anxiety can be managed and she can be functional, but this depends on the " "situation. Eladia denies any panic attacks. She denies any aggression, irritability or elevated moods.  Patient denies mood swings and states that are not extreme if she experiences them.  Reports crying spells, describing them as \"warranted\".  She explains these are very infrequent. SIDNEY-7 score 9    Eladia endorses symptoms of depression that she reports are \"not as bad today\".  She reports she is hopeful due to this appointment and rates her anxiety a 3-4/10 on the severity scale.  Patient states her anxiety started approximately 10th grade due to falling grades in COVID-19 pandemic.  Reports intermittent feelings of guilt, worthlessness, and hopelessness.  She did endorse past passive suicidal thoughts with no plan or intent and states this last occurred approximately 10th or 11th grade.  Adamantly denies any suicidal thoughts, plan, or intent currently.  Denies any homicidal thoughts at this time. Eladia reports past self-harm of \"punching self in the legs\", occurring in 10th or 11th grade.  Denies any current self-harm.  She states her dad has a gun and she is pretty sure that it is locked up she does not know where it is. PHQ-9 score 16    Eladia has never been formally diagnosed with ADHD.  She states her grades were good up until 10th grade and then she started to \"feel her emotions\" and they started to decline.  She states that she feels she \"dissociated too much\".  She was in the gifted program during school. Eladia reports a one-time occurrence when she was at a [employer] game with her family, her water bottle lid was on wrong and she was having an intrusive  thoughts stating that if she did not correct the lid, the batter would get out.  She also reports a vivid imagination and combination of intrusive thoughts consisting of a fleeting thought to harm others that she did not wish to have. She adamantly denies wanting to hurt anyone and states it is hard to explain. She states it is not gory and is a fleeting " thought that is then pushed out of her head. She reports these thoughts do not happen often. She denies nightmares or any symptoms suggestive of PTSD.  Will start Lexapro 5 mg daily.      HPI ROS Appetite Changes and Sleep: normal sleep, adequate number of sleep hours, normal appetite, adequate appetite, decreased energy    Psychiatric Review Of Systems:    Sleep changes: no change  Appetite changes: no change  Weight changes: no change  Energy/anergy: decreased  Interest/pleasure/anhedonia: no change  Somatic symptoms: no  Anxiety/panic: yes  Amy: no  Guilty/hopeless: yes  Self injurious behavior/risky behavior: history of punching self in legs  Suicidal ideation: no  Homicidal ideation: no  Auditory hallucinations: no  Visual hallucinations: no  Other hallucinations: no  Delusional thinking: no  Eating disorder history: no  Obsessive/compulsive symptoms: no    Review Of Systems:    Mood Anxiety and Depression   Behavior Normal    Thought Content Normal   General Normal    Personality Normal   Other Psych Symptoms Normal   Constitutional negative   ENT negative   Cardiovascular negative   Respiratory negative   Gastrointestinal negative   Genitourinary negative   Musculoskeletal negative   Integumentary negative   Neurological negative   Endocrine negative   Other Symptoms none     Allergies:   NKDA     Past Surgical History:  Collar bone - plates and screws   [place] teeth extraction     Past Medical History:   Denies  Seizure history- Denies    Past Psychiatric History:   Past Inpatient Psychiatric Treatment:   No history of past inpatient psychiatric admissions  Past Outpatient Psychiatric Treatment:    In therapy with Dorene Granados  Past Suicide Attempts: no  Past Violent Behavior: no  Past Psychiatric Medication Trials: None    Family Psychiatric History:   Sister- depression/anxiety  Paternal Grandmother- anxiety     Family History:  History reviewed. No pertinent family  history.    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[employer], [employer] room   Hobbies- Video games, reading, rock climbing, long board- how he broke collar bone     Still enjoy (Anhedonia)-Depends- can't focus  [employer] [facility] [employer]    Substance Abuse History:   Occasional Marijuana- Edibles- last used month and a half ago      Social History     Substance and Sexual Activity   Drug Use Not on file     Social History     Socioeconomic History    Marital status: Single     Spouse name: Not on file    Number of children: Not on file    Years of education: Not on file    Highest education level: Not on file   Occupational History    Not on file   Tobacco Use    Smoking status: Never    Smokeless tobacco: Never   Substance and Sexual Activity    Alcohol use: Not on file    Drug use: Not on file    Sexual activity: Not on file   Other Topics Concern    Not on file   Social History Narrative    Not on file     Social Determinants of Health     Financial Resource Strain: Low Risk  (4/11/2024)    Received from [employer]    Overall Financial Resource Strain (CARDIA)     Difficulty of Paying Living Expenses: Not hard at all   Food Insecurity: No Food Insecurity (4/11/2024)    Received from [employer]    Hunger Vital Sign     Worried About Running Out of Food in the Last Year: Never true     Ran Out of Food in the Last Year: Never true   Transportation Needs: No Transportation Needs (4/11/2024)    Received from [employer]    PRAPARE - Transportation     Lack of Transportation (Medical): No     Lack of Transportation (Non-Medical): No   Physical Activity: Not on file   Stress: Not on file   Social Connections: Not on file   Intimate Partner Violence: Not on file   Housing Stability: Unknown (4/11/2024)    Received from [employer]    Housing Stability Vital Sign     Unable to Pay for Housing  in the Last Year: No     Number of Times Moved in the Last Year: Not on file     Homeless in the Last Year: Not on file     Social History     Social History Narrative    Not on file     Traumatic History:   Abuse: Denies  Other Traumatic Events: Rather not talk about it    History Review:  The following portions of the patient's history were reviewed and updated as appropriate: allergies, current medications, past family history, past medical history, past social history, past surgical history, and problem list.     OBJECTIVE:     Mental Status Evaluation:    Appearance age appropriate, casually dressed, dressed appropriately, adequate grooming   Behavior pleasant, cooperative, calm   Speech normal rate and volume   Mood anxious   Affect normal range and intensity, appropriate   Thought Processes organized, logical, coherent, goal directed   Associations intact associations   Thought Content normal   Perceptual Disturbances: none   Abnormal Thoughts  Risk Potential Suicidal ideation - None at present  Homicidal ideation - None  Potential for aggression - No   Orientation oriented to person, place, time/date, and situation   Memory recent and remote memory grossly intact   Cosciousness alert and awake   Attention Span attention span and concentration are age appropriate   Intellect Appears to be Above Average Intelligence   Insight good   Judgement good   Muscle Strength and  Gait normal muscle strength and normal muscle tone, normal gait and normal balance   Language Age appropriate   Fund of Knowledge Age appropriate   Pain none   Pain Scale N/A       Laboratory Results: No results found for this or any previous visit.    Assessment/Plan:     Impression:  Depression  Anxiety     Start Lexapro 5 mg daily for anxiety and depression  Recommend to continue outpatient therapy at [facility]  Medical follow up with PCP as needed  Aware of 24 hour and weekend coverage for urgent situations accessed by calling [facility]  [facility] Outpatient main practice number  Follow up in 4 weeks      Diagnoses:     Diagnoses and all orders for this visit:    Adjustment disorder with mixed anxiety and depressed mood  -     escitalopram (LEXAPRO) 5 mg tablet; Take 1 tablet (5 mg total) by mouth daily    Depression, unspecified depression type  -     escitalopram (LEXAPRO) 5 mg tablet; Take 1 tablet (5 mg total) by mouth daily    Anxiety  -     escitalopram (LEXAPRO) 5 mg tablet; Take 1 tablet (5 mg total) by mouth daily    Other orders  -     estradiol (ESTRACE) 2 MG tablet; Take 2 mg by mouth  -     Discontinue: ibuprofen (MOTRIN) 200 mg tablet; Take 1 tablet by mouth every 6 (six) hours as needed (Patient not taking: Reported on 6/5/2024)  -     Progesterone 100 MG CAPS; Take 100 mg by mouth  -     spironolactone (ALDACTONE) 50 mg tablet; Take 50 mg by mouth  -     Acetaminophen 500 MG; Take 500 mg by mouth (Patient not taking: Reported on 6/5/2024)        Treatment Recommendations/Precautions:    Risks/Benefits      Risks, Benefits And Possible Side Effects Of Medications:    Risks, benefits, and possible side effects of medications explained to patient and patient verbalizes understanding and agreement for treatment.    Controlled Medication Discussion:     Not applicable    Treatment Plan: Next treatment plan due 8/27/2024      Visit Time     Visit Start Time: 10:00 AM  Visit Stop Time: 10 :35 AM  Total Visit Duration: 35 minutes    MERCEDES Miramontes  06/05/24

## 2024-06-03 NOTE — PSYCH
"Behavioral Health Psychotherapy Progress Note    Psychotherapy Provided: Individual Psychotherapy     1. Adjustment disorder with mixed anxiety and depressed mood            Goals addressed in session: Goal 1     DATA: Therapist and client continued to discuss client's mental health and desire to take a break from college. Client shared that she had still not written the letter to the head of the department at [employer]. The letter was due by the end of May. Client shared that she was struggling to get started and revisit the reasons for her struggles while at school. Therapist and client discussed breaking down the task into smaller more manageable sections.    During this session, this clinician used the following therapeutic modalities: Cognitive Behavioral Therapy, Solution-Focused Therapy, and Supportive Psychotherapy    Substance Abuse was not addressed during this session. If the client is diagnosed with a co-occurring substance use disorder, please indicate any changes in the frequency or amount of use: NA. Stage of change for addressing substance use diagnoses: No substance use/Not applicable    ASSESSMENT:  Eladia Colunga presents with a Anxious mood.     her affect is Normal range and intensity, which is congruent, with her mood and the content of the session. The client has made progress on their goals.     Eladia Colunga presents with a none risk of suicide, none risk of self-harm, and none risk of harm to others.    For any risk assessment that surpasses a \"low\" rating, a safety plan must be developed.    A safety plan was indicated: no  If yes, describe in detail NA    PLAN: Between sessions, Eladia Colunga will use her coping skills. At the next session, the therapist will use Cognitive Behavioral Therapy to address anxiety.    Behavioral Health Treatment Plan and Discharge Planning: Eladia Colunga is aware of and agrees to continue to work on their treatment plan. They have identified and are working toward " their discharge goals. yes    Visit start and stop times:    05/28/24  Start Time: 1002  Stop Time: 1054  Total Visit Time: 52 minutes

## 2024-06-05 ENCOUNTER — OFFICE VISIT (OUTPATIENT)
Dept: PSYCHIATRY | Facility: CLINIC | Age: 19
End: 2024-06-05
Payer: COMMERCIAL

## 2024-06-05 ENCOUNTER — SOCIAL WORK (OUTPATIENT)
Dept: BEHAVIORAL/MENTAL HEALTH CLINIC | Facility: CLINIC | Age: 19
End: 2024-06-05
Payer: COMMERCIAL

## 2024-06-05 DIAGNOSIS — F43.23 ADJUSTMENT DISORDER WITH MIXED ANXIETY AND DEPRESSED MOOD: Primary | ICD-10-CM

## 2024-06-05 DIAGNOSIS — F32.A DEPRESSION, UNSPECIFIED DEPRESSION TYPE: ICD-10-CM

## 2024-06-05 DIAGNOSIS — F41.9 ANXIETY: ICD-10-CM

## 2024-06-05 DIAGNOSIS — F41.9 ANXIETY: Primary | ICD-10-CM

## 2024-06-05 PROCEDURE — 90834 PSYTX W PT 45 MINUTES: CPT | Performed by: COUNSELOR

## 2024-06-05 PROCEDURE — 90792 PSYCH DIAG EVAL W/MED SRVCS: CPT

## 2024-06-05 RX ORDER — ESCITALOPRAM OXALATE 5 MG/1
5 TABLET ORAL DAILY
Qty: 30 TABLET | Refills: 0 | Status: SHIPPED | OUTPATIENT
Start: 2024-06-05

## 2024-06-11 NOTE — PSYCH
"Behavioral Health Psychotherapy Progress Note    Psychotherapy Provided: Individual Psychotherapy     1. Anxiety            Goals addressed in session: Goal 1     DATA: Client shared that she had written the letter to her  at [employer] asking to be kept in the program. While client had submitted the letter on the final day, therapist verbally reinforced client having written and submitted the letter, both of which required vulnerability on the part of client. Therapist and client discussed how difficult it is for her to be misgendered and called by her dead-name at her job at [employer], but that she does not feel comfortable correcting anyone at this point. Therapist and client discussed the impact that this likely has on client's mental health. Therapist and client also discussed how client may want to share her name and pronouns with her father's side of the family prior to arriving at their beach house for vacation.    During this session, this clinician used the following therapeutic modalities: Gender Affirmation Therapy, Solution-Focused Therapy, and Supportive Psychotherapy    Substance Abuse was not addressed during this session. If the client is diagnosed with a co-occurring substance use disorder, please indicate any changes in the frequency or amount of use: NA. Stage of change for addressing substance use diagnoses: No substance use/Not applicable    ASSESSMENT:  Eladia Colunga presents with a Euthymic/ normal and Anxious mood.     her affect is Normal range and intensity, which is congruent, with her mood and the content of the session. The client has made progress on their goals.     Eladia Colunga presents with a none risk of suicide, none risk of self-harm, and none risk of harm to others.    For any risk assessment that surpasses a \"low\" rating, a safety plan must be developed.    A safety plan was indicated: no  If yes, describe in detail NA    PLAN: Between sessions, Eladia Colunga will use her " coping skills. At the next session, the therapist will use Gender Affirmation Therapy to address gender dysphoria.    Behavioral Health Treatment Plan and Discharge Planning: Eladia Colunga is aware of and agrees to continue to work on their treatment plan. They have identified and are working toward their discharge goals. yes    Visit start and stop times:    06/05/24  Start Time: 1400  Stop Time: 1450  Total Visit Time: 50 minutes

## 2024-06-13 ENCOUNTER — SOCIAL WORK (OUTPATIENT)
Dept: BEHAVIORAL/MENTAL HEALTH CLINIC | Facility: CLINIC | Age: 19
End: 2024-06-13
Payer: COMMERCIAL

## 2024-06-13 DIAGNOSIS — F41.9 ANXIETY: Primary | ICD-10-CM

## 2024-06-13 PROCEDURE — 90834 PSYTX W PT 45 MINUTES: CPT | Performed by: COUNSELOR

## 2024-06-13 NOTE — PSYCH
"Behavioral Health Psychotherapy Progress Note    Psychotherapy Provided: Individual Psychotherapy     1. Anxiety            Goals addressed in session: Goal 1     DATA: Client shared that her prescription did not appear to have been received by the pharmacy, but that while she had built up the courage to reach out to her prescriber, that she felt anxious about calling the pharmacy. Therapist and client discussed the next steps and the importance of reaching out to the pharmacy, and client agreed to do this. Therapist and client continued to discuss client's anxiety regarding coming out to her extended family on her father's side. Client shared that her parents use her deadname rather than saying \"deadname\" when discussing how client would like to handle situations, such as when they are around extended family. Therapist and client discussed client's anxiety about correcting her parents and explaining the reasoning behind saying \"deadname\" rather than saying her deadname.     During this session, this clinician used the following therapeutic modalities: Cognitive Behavioral Therapy, Gender Affirmation Therapy, Solution-Focused Therapy, and Supportive Psychotherapy    Substance Abuse was not addressed during this session. If the client is diagnosed with a co-occurring substance use disorder, please indicate any changes in the frequency or amount of use: NA. Stage of change for addressing substance use diagnoses: No substance use/Not applicable    ASSESSMENT:  Eladia Colunga presents with a Anxious mood.     her affect is Normal range and intensity, which is congruent, with her mood and the content of the session. The client has made progress on their goals.     Eladia Colunga presents with a none risk of suicide, none risk of self-harm, and none risk of harm to others.    For any risk assessment that surpasses a \"low\" rating, a safety plan must be developed.    A safety plan was indicated: no  If yes, describe in detail " NA    PLAN: Between sessions, Eladia Colunga will use her coping skills. At the next session, the therapist will use Cognitive Behavioral Therapy to address anxious thoughts.    Behavioral Health Treatment Plan and Discharge Planning: Eladia Colunga is aware of and agrees to continue to work on their treatment plan. They have identified and are working toward their discharge goals. yes    Visit start and stop times:    06/13/24  Start Time: 0803  Stop Time: 0852  Total Visit Time: 49 minutes

## 2024-06-14 DIAGNOSIS — F43.23 ADJUSTMENT DISORDER WITH MIXED ANXIETY AND DEPRESSED MOOD: ICD-10-CM

## 2024-06-14 DIAGNOSIS — F32.A DEPRESSION, UNSPECIFIED DEPRESSION TYPE: ICD-10-CM

## 2024-06-14 DIAGNOSIS — F41.9 ANXIETY: ICD-10-CM

## 2024-06-14 RX ORDER — ESCITALOPRAM OXALATE 5 MG/1
5 TABLET ORAL DAILY
Qty: 30 TABLET | Refills: 0 | Status: SHIPPED | OUTPATIENT
Start: 2024-06-14

## 2024-06-14 NOTE — PROGRESS NOTES
Received message from Therapist, Dorene Monge, stating Eladia requested her email be noted on Lexapro prescription. Sent new prescription for Lexapro 5 mg daily to preferred pharmacy- included email for Eladia.

## 2024-06-19 ENCOUNTER — SOCIAL WORK (OUTPATIENT)
Dept: BEHAVIORAL/MENTAL HEALTH CLINIC | Facility: CLINIC | Age: 19
End: 2024-06-19
Payer: COMMERCIAL

## 2024-06-19 DIAGNOSIS — F41.9 ANXIETY: Primary | ICD-10-CM

## 2024-06-19 PROCEDURE — 90834 PSYTX W PT 45 MINUTES: CPT | Performed by: COUNSELOR

## 2024-06-20 NOTE — PSYCH
Behavioral Health Psychotherapy Progress Note    Psychotherapy Provided: Individual Psychotherapy     1. Anxiety            Goals addressed in session: Goal 1     DATA: Client shared that she and her father had further researched the process for client not attending the Fall semester at [employer], and that she had submitted the paperwork, which therapist verbally reinforced. Therapist and client discussed client's lack of motivation to complete any tasks that client does not consider to be fun, which currently includes her one job. Therapist dinorah a connection between this and client's avoidance of schoolwork while at college, instead choosing to engage in preferred activities such as spending time with friends. Therapist pointed out to client that she needs to actively work on this (rather than waiting for medication to improve it) prior to beginning to attend college again, or that the same patterns are likely to occur again. Client stated that she needs to reschedule her appointment with her med provider. When therapist explained that client will need to speak with the  in order to do this, client expressed anxiety and tried to find other options. Therapist encouraged client to challenge the anxiety by speaking to the , and reminded client that it would not help her for therapist to do it for her. Client stated that she will try rescheduling directly with her med provider.    During this session, this clinician used the following therapeutic modalities: Client-centered Therapy, Cognitive Behavioral Therapy, Solution-Focused Therapy, and Supportive Psychotherapy    Substance Abuse was not addressed during this session. If the client is diagnosed with a co-occurring substance use disorder, please indicate any changes in the frequency or amount of use: NA. Stage of change for addressing substance use diagnoses: No substance use/Not applicable    ASSESSMENT:  Eladia Colunga presents with a  "Euthymic/ normal and Anxious mood.     her affect is Normal range and intensity, which is congruent, with her mood and the content of the session. The client has made progress on their goals.     Eladia Colunga presents with a none risk of suicide, none risk of self-harm, and none risk of harm to others.    For any risk assessment that surpasses a \"low\" rating, a safety plan must be developed.    A safety plan was indicated: no  If yes, describe in detail NA    PLAN: Between sessions, Eladia Colunga will use her coping skills. At the next session, the therapist will use Cognitive Behavioral Therapy to address anxiety.    Behavioral Health Treatment Plan and Discharge Planning: Eladia Colunga is aware of and agrees to continue to work on their treatment plan. They have identified and are working toward their discharge goals. yes    Visit start and stop times:    06/19/24  Start Time: 1400  Stop Time: 1447  Total Visit Time: 47 minutes  "

## 2024-06-25 ENCOUNTER — SOCIAL WORK (OUTPATIENT)
Dept: BEHAVIORAL/MENTAL HEALTH CLINIC | Facility: CLINIC | Age: 19
End: 2024-06-25
Payer: COMMERCIAL

## 2024-06-25 DIAGNOSIS — F41.9 ANXIETY: Primary | ICD-10-CM

## 2024-06-25 DIAGNOSIS — F32.A DEPRESSION, UNSPECIFIED DEPRESSION TYPE: ICD-10-CM

## 2024-06-25 PROCEDURE — 90834 PSYTX W PT 45 MINUTES: CPT | Performed by: COUNSELOR

## 2024-06-25 NOTE — PSYCH
"PSYCHIATRIC EVALUATION     [facility]    This note was not shared with the patient due to reasonable likelihood of causing patient harm         Name and Date of Birth:  Tommy Colunga 18 y.o. 2005    Date of Visit: 7/3/2024    Reason for visit: 4-week follow-up for medication management     subjective:  Medication compliance: yes  Medication side effects: Denies    HPI   Eladia is a 18 y.o. adult  (prefers pronouns She/Her) with a history of Adjustment Disorder, with mixed anxiety and depressed mood, who presents for follow-up for medication management.  Currently being observed on Lexapro 5 mg daily.  Eladia is currently connected to outpatient therapy with Dorene Monge at [facility]. No additional services at this time.     Eladia reports compliance with her medication denies any side effects at this time.  She reports about 3 days after starting the Lexapro 5 mg she ended up having a stomach bug and slept on her arm which made her arm fall asleep and it felt weird.  She states it went away fast, but she thought it was a side effect of the medication at first.  She reports a slight decrease in appetite, but is not sure if that is from the medication as well.    Eladia reports her mood today is \"fine\". She states her sleep is better, but reported the first day or 2 she felt tired and is unsure if that was from the oncoming illness or from the medication. Energy levels and motivation remain \"a little low\". Appetite remains adequate but is somewhat decreased.  She denies any suicidal thoughts, plan, or intent.  Denies HI, AH, or VH. Eladia does not endorse any aggression, mood swings, or irritability.  She denies frequent crying spells or elevated moods.  She rates her anxiety today on the lower end of the scale on a 0-10 severity scale.  She rates her depression a 4/10 on the severity scale.  She does state she likes to read and she has been finding that sometimes " her concentration is okay when she is reading, but other times she cannot seem to focus and concentrate. She feels she may have ADHD.  Advised Eladia she can reach out to her insurance company and inquire about psychological testing for formal ADHD diagnosis.  Also advised her that once her mood is under control we can revisit this as sometimes anxiety and depression can interfere with focus and concentration.  She verbalized understanding and agreement.  Will increase Lexapro to 10 mg daily.    HPI ROS Appetite Changes and Sleep: normal sleep, adequate number of sleep hours, normal appetite, adequate appetite, decreased energy    Psychiatric Review Of Systems:    Sleep changes: no change  Appetite changes: decreased, but adequate  Weight changes: no change  Energy/anergy: decreased  Interest/pleasure/anhedonia: no change  Somatic symptoms: no  Anxiety/panic: yes  Amy: no  Guilty/hopeless: yes  Self injurious behavior/risky behavior: history of punching self in legs  Suicidal ideation: no  Homicidal ideation: no  Auditory hallucinations: no  Visual hallucinations: no  Other hallucinations: no  Delusional thinking: no  Eating disorder history: no  Obsessive/compulsive symptoms: no    Review Of Systems:    Mood Anxiety and Depression   Behavior Normal    Thought Content Normal   General Normal    Personality Normal   Other Psych Symptoms Normal   Constitutional negative   ENT negative   Cardiovascular negative   Respiratory negative   Gastrointestinal negative   Genitourinary negative   Musculoskeletal negative   Integumentary negative   Neurological negative   Endocrine negative   Other Symptoms none     Allergies:   NKDA     Past Surgical History:  Collar bone - plates and screws   [place] teeth extraction     Past Medical History:   Denies  Seizure history- Denies    Past Psychiatric History:   Past Inpatient Psychiatric Treatment:   No history of past inpatient psychiatric admissions  Past Outpatient Psychiatric  Treatment:    In therapy with Dorene Granados  Past Suicide Attempts: no  Past Violent Behavior: no  Past Psychiatric Medication Trials: None    Family Psychiatric History:   Sister- depression/anxiety  Paternal Grandmother- anxiety     Family History:  History reviewed. No pertinent family history.    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[employer], [employer] room   Hobbies- Video games, reading, rock climbing, long board- how he broke collar bone     Still enjoy (Anhedonia)-Depends- can't focus  [employer] [facility] [employer]    Substance Abuse History:   Occasional Marijuana- Edibles- last used month and a half ago      Social History     Substance and Sexual Activity   Drug Use Not on file     Social History     Socioeconomic History    Marital status: Single     Spouse name: Not on file    Number of children: Not on file    Years of education: Not on file    Highest education level: Not on file   Occupational History    Not on file   Tobacco Use    Smoking status: Never    Smokeless tobacco: Never   Substance and Sexual Activity    Alcohol use: Not on file    Drug use: Not on file    Sexual activity: Not on file   Other Topics Concern    Not on file   Social History Narrative    Not on file     Social Determinants of Health     Financial Resource Strain: Low Risk  (4/11/2024)    Received from [facility]    Overall Financial Resource Strain (CARDIA)     Difficulty of Paying Living Expenses: Not hard at all   Food Insecurity: No Food Insecurity (4/11/2024)    Received from [facility]    Hunger Vital Sign     Worried About Running Out of Food in the Last Year: Never true     Ran Out of Food in the Last Year: Never true   Transportation Needs: No Transportation Needs (4/11/2024)    Received from [facility]    PRAPARE - Transportation     Lack of Transportation (Medical): No     Lack of  Transportation (Non-Medical): No   Physical Activity: Not on file   Stress: Not on file   Social Connections: Not on file   Intimate Partner Violence: Not on file   Housing Stability: Unknown (4/11/2024)    [facility]    Housing Stability Vital Sign     Unable to Pay for Housing in the Last Year: No     Number of Times Moved in the Last Year: Not on file     Homeless in the Last Year: Not on file     Social History     Social History Narrative    Not on file     Traumatic History:   Abuse: Denies  Other Traumatic Events: Rather not talk about it    History Review:  The following portions of the patient's history were reviewed and updated as appropriate: allergies, current medications, past family history, past medical history, past social history, past surgical history, and problem list.     OBJECTIVE:     Mental Status Evaluation:    Appearance age appropriate, casually dressed, dressed appropriately, adequate grooming   Behavior pleasant, cooperative, calm   Speech normal rate and volume   Mood anxious   Affect normal range and intensity, appropriate   Thought Processes organized, logical, coherent, goal directed   Associations intact associations   Thought Content normal   Perceptual Disturbances: none   Abnormal Thoughts  Risk Potential Suicidal ideation - None at present  Homicidal ideation - None  Potential for aggression - No   Orientation oriented to person, place, time/date, and situation   Memory recent and remote memory grossly intact   Cosciousness alert and awake   Attention Span attention span and concentration are age appropriate   Intellect Appears to be Above Average Intelligence   Insight good   Judgement good   Muscle Strength and  Gait normal muscle strength and normal muscle tone, normal gait and normal balance   Language Age appropriate   Fund of Knowledge Age appropriate   Pain none   Pain Scale N/A       Laboratory Results: No results found for this or any  previous visit.    Assessment/Plan:     Impression:  Depression  Anxiety     Increase Lexapro to 10 mg daily for anxiety and depression- (she has supply of the 5 mg-advised to take 2 of these to total 10 mg daily)  Recommend to continue outpatient therapy at [facility]  Recommended psychological testing for ADHD diagnoses- Advised to contact insurance company   Medical follow up with PCP as needed  Aware of 24 hour and weekend coverage for urgent situations accessed by calling [facility] main practice number  Follow up in 4 weeks      Diagnoses:     Diagnoses and all orders for this visit:    Anxiety  -     escitalopram (Lexapro) 10 mg tablet; Take 1 tablet (10 mg total) by mouth daily    Depression, unspecified depression type  -     escitalopram (Lexapro) 10 mg tablet; Take 1 tablet (10 mg total) by mouth daily    Other orders  -     HYDROcodone-acetaminophen (NORCO) 5-325 mg per tablet; TAKE 1 TABLET EVERY 6 HOURS AS NEEDED FOR SEVERE PAIN (SCALE 7-10)          Treatment Recommendations/Precautions:    Risks/Benefits      Risks, Benefits And Possible Side Effects Of Medications:    Risks, benefits, and possible side effects of medications explained to patient and patient verbalizes understanding and agreement for treatment.    Controlled Medication Discussion:     Not applicable    Treatment Plan: Next treatment plan due 8/27/2024      Visit Time     Visit Start Time: 2:55 PM  Visit Stop Time: 3: 05 PM  Total Visit Duration: 10 minutes    MERCEDES Miramontes  07/03/24

## 2024-06-30 NOTE — PSYCH
"Behavioral Health Psychotherapy Progress Note    Psychotherapy Provided: Individual Psychotherapy     1. Anxiety        2. Depression, unspecified depression type            Goals addressed in session: Goal 1     DATA: Therapist and client discussed client's desire to leave her job at [employer] due to going by her dead name there and having her incorrect pronouns used. Client is not open to telling Arjun that she uses she/her and her preferred name. Client shared that she had not shared with her parents the impact that it has on her when she is referred to by her dead name and by the incorrect pronouns. Therapist and client discussed the possibility of client approaching her mother with this, and explain that that is the reason that she would like to stop working at [employer].    During this session, this clinician used the following therapeutic modalities: Client-centered Therapy, Cognitive Behavioral Therapy, Gender Affirmation Therapy, Solution-Focused Therapy, and Supportive Psychotherapy    Substance Abuse was not addressed during this session. If the client is diagnosed with a co-occurring substance use disorder, please indicate any changes in the frequency or amount of use: NA. Stage of change for addressing substance use diagnoses: No substance use/Not applicable    ASSESSMENT:  Eladia Colunga presents with a Anxious mood.     her affect is Normal range and intensity, which is congruent, with her mood and the content of the session. The client has made progress on their goals.     Eladia Colunga presents with a none risk of suicide, none risk of self-harm, and none risk of harm to others.    For any risk assessment that surpasses a \"low\" rating, a safety plan must be developed.    A safety plan was indicated: no  If yes, describe in detail NA    PLAN: Between sessions, Eladia Colunga will use her coping skills. At the next session, the therapist will use Gender Affirmation Therapy to address gender identity.    Behavioral " Health Treatment Plan and Discharge Planning: Eladia Colunga is aware of and agrees to continue to work on their treatment plan. They have identified and are working toward their discharge goals. yes    Visit start and stop times:    06/25/24  Start Time: 1001  Stop Time: 1053  Total Visit Time: 52 minutes

## 2024-07-01 PROBLEM — F64.9 GENDER DYSPHORIA: Status: ACTIVE | Noted: 2024-07-01

## 2024-07-02 RX ORDER — HYDROCODONE BITARTRATE AND ACETAMINOPHEN 5; 325 MG/1; MG/1
TABLET ORAL
COMMUNITY
Start: 2024-04-19

## 2024-07-03 ENCOUNTER — SOCIAL WORK (OUTPATIENT)
Dept: BEHAVIORAL/MENTAL HEALTH CLINIC | Facility: CLINIC | Age: 19
End: 2024-07-03
Payer: COMMERCIAL

## 2024-07-03 ENCOUNTER — OFFICE VISIT (OUTPATIENT)
Dept: PSYCHIATRY | Facility: CLINIC | Age: 19
End: 2024-07-03
Payer: COMMERCIAL

## 2024-07-03 DIAGNOSIS — F41.9 ANXIETY: Primary | ICD-10-CM

## 2024-07-03 DIAGNOSIS — F32.A DEPRESSION, UNSPECIFIED DEPRESSION TYPE: ICD-10-CM

## 2024-07-03 DIAGNOSIS — F64.9 GENDER DYSPHORIA: ICD-10-CM

## 2024-07-03 PROCEDURE — 90834 PSYTX W PT 45 MINUTES: CPT | Performed by: COUNSELOR

## 2024-07-03 PROCEDURE — 99212 OFFICE O/P EST SF 10 MIN: CPT

## 2024-07-03 RX ORDER — ESCITALOPRAM OXALATE 10 MG/1
TABLET ORAL
Qty: 30 TABLET | Refills: 0 | Status: SHIPPED | OUTPATIENT
Start: 2024-07-03

## 2024-07-08 NOTE — PSYCH
"Behavioral Health Psychotherapy Progress Note    Psychotherapy Provided: Individual Psychotherapy     1. Anxiety        2. Gender dysphoria            Goals addressed in session: Goal 1     DATA: Client shared that she had explained to her parents that she would like them to say \"dead name\" rather than using her dead name, and that they were receptive to this feedback, which therapist verbally reinforced. Client also shared that she and her parents had decided that they would tell client's father's side of the family as they would be seeing them the following day for a Fourth of July party at client's house. Client's mother and father would be initiating telling client's family. Client appeared nervous but relieved at the idea of her entire family knowing that she is a trans woman. Therapist verbally reinforced client's willingness to be vulnerable.    During this session, this clinician used the following therapeutic modalities: Client-centered Therapy, Gender Affirmation Therapy, Solution-Focused Therapy, and Supportive Psychotherapy    Substance Abuse was not addressed during this session. If the client is diagnosed with a co-occurring substance use disorder, please indicate any changes in the frequency or amount of use: NA. Stage of change for addressing substance use diagnoses: No substance use/Not applicable    ASSESSMENT:  Eladia Colunga presents with a Euthymic/ normal and Anxious mood.     her affect is Normal range and intensity, which is congruent, with her mood and the content of the session. The client has made progress on their goals.     Eladia Colunga presents with a none risk of suicide, none risk of self-harm, and none risk of harm to others.    For any risk assessment that surpasses a \"low\" rating, a safety plan must be developed.    A safety plan was indicated: no  If yes, describe in detail NA    PLAN: Between sessions, Eladia Colunga will use her coping skills. At the next session, the therapist will " use Gender Affirmation Therapy to address gender dysphoria.    Behavioral Health Treatment Plan and Discharge Planning: Eladia Colunga is aware of and agrees to continue to work on their treatment plan. They have identified and are working toward their discharge goals. yes    Visit start and stop times:    07/03/24  Start Time: 1400  Stop Time: 1447  Total Visit Time: 47 minutes

## 2024-07-17 ENCOUNTER — SOCIAL WORK (OUTPATIENT)
Dept: BEHAVIORAL/MENTAL HEALTH CLINIC | Facility: CLINIC | Age: 19
End: 2024-07-17
Payer: COMMERCIAL

## 2024-07-17 DIAGNOSIS — F41.9 ANXIETY: Primary | ICD-10-CM

## 2024-07-17 DIAGNOSIS — F64.9 GENDER DYSPHORIA: ICD-10-CM

## 2024-07-17 PROCEDURE — 90834 PSYTX W PT 45 MINUTES: CPT | Performed by: COUNSELOR

## 2024-07-17 NOTE — PSYCH
Behavioral Health Psychotherapy Progress Note    Psychotherapy Provided: Individual Psychotherapy     1. Anxiety        2. Gender dysphoria            Goals addressed in session: Goal 1     DATA: Client shared several times in the past few days where she had challenged her anxiety rather than avoiding something due to feeling anxious, which therapist verbally reinforced. This included doing things such as having an anxiety producing conversation with a romantic interest and seeking clarity in their conversation. Client also shared that she now has come out to her entire family, and that they have all been accepting. Client also shared that she had felt triggered while trying on traditionally feminine clothes in the mall, and had asked for support from her friend by taking a break and then visiting stores which were not mainly clothing stores. Therapist verbally reinforced client asking for what she needed from her friend. Lastly, client shared that she has been working on improving upon her hygiene and has been showering most days of the week, which therapist verbally reinforced.    During this session, this clinician used the following therapeutic modalities: Client-centered Therapy, Cognitive Behavioral Therapy, Gender Affirmation Therapy, Solution-Focused Therapy, and Supportive Psychotherapy    Substance Abuse was not addressed during this session. If the client is diagnosed with a co-occurring substance use disorder, please indicate any changes in the frequency or amount of use: NA. Stage of change for addressing substance use diagnoses: No substance use/Not applicable    ASSESSMENT:  Eladia Colunga presents with a Euthymic/ normal mood.     her affect is Normal range and intensity, which is congruent, with her mood and the content of the session. The client has made progress on their goals.     Eladia Colunga presents with a none risk of suicide, none risk of self-harm, and none risk of harm to others.    For any  "risk assessment that surpasses a \"low\" rating, a safety plan must be developed.    A safety plan was indicated: no  If yes, describe in detail NA    PLAN: Between sessions, Eladia Colunga will use her coping skills. At the next session, the therapist will use Cognitive Behavioral Therapy to address anxiety.    Behavioral Health Treatment Plan and Discharge Planning: Eladia Colunga is aware of and agrees to continue to work on their treatment plan. They have identified and are working toward their discharge goals. yes    Visit start and stop times:    07/17/24  Start Time: 1405  Stop Time: 1455  Total Visit Time: 50 minutes  "

## 2024-07-24 NOTE — PSYCH
"PSYCHIATRIC EVALUATION     [facility]    This note was not shared with the patient due to reasonable likelihood of causing patient harm     VIRTUAL CARE DOCUMENTATION:     1. This service was provided via Telemedicine using Other: [employer] embedded      2. Parties in the room with patient during teleconsult Patient only    3. Confidentiality My office door was closed     4. Participants No one else was in the room    5. Patient acknowledged consent and understanding of privacy and security of the  Telemedicine consult. I informed the patient that I have reviewed their record in Epic and presented the opportunity for them to ask any questions regarding the visit today.  The patient agreed to participate.    6. Time spent 10 minutes     Name and Date of Birth:  Tommy Colunga 18 y.o. 2005 (Eladia)    Date of Visit: 7/31/2024    Reason for visit:  follow-up for medication management     subjective:  Medication compliance: yes  Medication side effects: Denies    HPI   Eladia is a 18 y.o. adult  (prefers pronouns She/Her) with a history of Adjustment Disorder, with mixed anxiety and depressed mood, who presents for follow-up for medication management.  Currently being observed on Lexapro 10 mg daily.  Eladia is currently connected to outpatient therapy with Dorene Monge at [facility]. No additional services at this time.     Eladia reports compliance with medication and denies any side effects at this time.  She reports her mood is \"sad\", as there is a situation occurring that is making her sad, however she does not want to talk about it as she has been speaking with her therapist about this.  She reports her sleep is \"iffy\" the past few weeks and has been waking up earlier and cannot fall back to sleep.  She states this has not been happening recently.  Energy levels and motivation are fine.  Her appetite remains adequate.  She adamantly denies any acute suicidal " "thoughts, however does admit to \"fleeting thoughts that come and go\".  She adamantly denies plan or intent. Eladia currently feels safe.  She denies HI, AH, and VH.  She does not endorse any aggression, irritability, frequent crying spells or elevated moods.  She reports mild mood swings during this situation she is going through which is making her feel sad.  She rates both her anxiety and depression today a 6/10 on the severity scale with 10 being the most severe.  Will increase Lexapro to 20 mg daily.    HPI ROS Appetite Changes and Sleep: normal sleep, adequate number of sleep hours, normal appetite, adequate appetite, decreased energy    Psychiatric Review Of Systems:    Sleep changes: no change  Appetite changes: decreased, but adequate  Weight changes: no change  Energy/anergy: decreased  Interest/pleasure/anhedonia: no change  Somatic symptoms: no  Anxiety/panic: yes  Amy: no  Guilty/hopeless: yes  Self injurious behavior/risky behavior: history of punching self in legs  Suicidal ideation: yes, fleeting thoughts that come and go, no plan or intent, feels safe, will go to the emergency room if warranted  Homicidal ideation: no  Auditory hallucinations: no  Visual hallucinations: no  Other hallucinations: no  Delusional thinking: no  Eating disorder history: no  Obsessive/compulsive symptoms: no    Review Of Systems:    Mood Anxiety and Depression   Behavior Normal    Thought Content Normal   General Normal    Personality Normal   Other Psych Symptoms Normal   Constitutional negative   ENT negative   Cardiovascular negative   Respiratory negative   Gastrointestinal negative   Genitourinary negative   Musculoskeletal negative   Integumentary negative   Neurological negative   Endocrine negative   Other Symptoms none     Allergies:   NKDA     Past Surgical History:  Collar bone - plates and screws   [place] teeth extraction     Past Medical History:   Denies  Seizure history- Denies    Past Psychiatric History: "   Past Inpatient Psychiatric Treatment:   No history of past inpatient psychiatric admissions  Past Outpatient Psychiatric Treatment:    In therapy with Dorene Monge  Previous [facility]  Past Suicide Attempts: no  Past Violent Behavior: no  Past Psychiatric Medication Trials: None    Family Psychiatric History:   Sister- depression/anxiety  Paternal Grandmother- anxiety     Family History:  History reviewed. No pertinent family history.    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[employer]   Hobbies- Video games, reading, rock climbing, long board- how he broke collar bone     Still enjoy (Anhedonia)-Depends- can't focus  [facility] [facility] [employer]    Substance Abuse History:   Occasional Marijuana- Edibles- last used month and a half ago      Social History     Substance and Sexual Activity   Drug Use Not on file     Social History     Socioeconomic History    Marital status: Single     Spouse name: Not on file    Number of children: Not on file    Years of education: Not on file    Highest education level: Not on file   Occupational History    Not on file   Tobacco Use    Smoking status: Never    Smokeless tobacco: Never   Substance and Sexual Activity    Alcohol use: Not on file    Drug use: Not on file    Sexual activity: Not on file   Other Topics Concern    Not on file   Social History Narrative    Not on file     Social Determinants of Health     Financial Resource Strain: Low Risk  (4/11/2024)    Received from [facility]    Overall Financial Resource Strain (CARDIA)     Difficulty of Paying Living Expenses: Not hard at all   Food Insecurity: No Food Insecurity (4/11/2024)    Received from [facility]    Hunger Vital Sign     Worried About Running Out of Food in the Last Year: Never true     Ran Out of Food in the Last Year: Never true   Transportation Needs: No Transportation Needs (4/11/2024)     [facility]    PRAPARE - Transportation     Lack of Transportation (Medical): No     Lack of Transportation (Non-Medical): No   Physical Activity: Not on file   Stress: Not on file   Social Connections: Not on file   Intimate Partner Violence: Not on file   Housing Stability: Unknown (4/11/2024)    [facility] from [facility]    Housing Stability Vital Sign     Unable to Pay for Housing in the Last Year: No     Number of Times Moved in the Last Year: Not on file     Homeless in the Last Year: Not on file     Social History     Social History Narrative    Not on file     Traumatic History:   Abuse: Denies  Other Traumatic Events: Rather not talk about it    History Review:  The following portions of the patient's history were reviewed and updated as appropriate: allergies, current medications, past family history, past medical history, past social history, past surgical history, and problem list.     OBJECTIVE:     Mental Status Evaluation:    Appearance age appropriate, casually dressed, dressed appropriately, adequate grooming   Behavior pleasant, cooperative, calm   Speech normal rate and volume   Mood depressed, anxious   Affect normal range and intensity, appropriate   Thought Processes organized, logical, coherent, goal directed   Associations intact associations   Thought Content normal   Perceptual Disturbances: none   Abnormal Thoughts  Risk Potential Suicidal ideation - None at present  Homicidal ideation - None  Potential for aggression - No   Orientation oriented to person, place, time/date, and situation   Memory recent and remote memory grossly intact   Cosciousness alert and awake   Attention Span attention span and concentration are age appropriate   Intellect Appears to be Above Average Intelligence   Insight good   Judgement good   Muscle Strength and  Gait normal muscle strength and normal muscle tone, normal gait and normal balance   Language  Age appropriate   Fund of Knowledge Age appropriate   Pain none   Pain Scale N/A       Laboratory Results: No results found for this or any previous visit.    Assessment/Plan:     Impression:  Depression  Anxiety     Increase Lexapro to 20 mg daily for anxiety and depression  Recommend to continue outpatient therapy at [facility]  Recommended psychological testing for ADHD diagnoses- Advised to contact insurance company   Medical follow up with PCP as needed  Aware of 24 hour and weekend coverage for urgent situations accessed by calling [facility] Outpatient main practice number  Follow up in 4 weeks      Diagnoses:     Diagnoses and all orders for this visit:    Depression, unspecified depression type  -     escitalopram (LEXAPRO) 20 mg tablet; Take 1 tablet (20 mg total) by mouth daily    Anxiety  -     escitalopram (LEXAPRO) 20 mg tablet; Take 1 tablet (20 mg total) by mouth daily         Treatment Recommendations/Precautions:    Risks/Benefits      Risks, Benefits And Possible Side Effects Of Medications:    Risks, benefits, and possible side effects of medications explained to patient and patient verbalizes understanding and agreement for treatment.    Controlled Medication Discussion:     Not applicable    Treatment Plan: Next treatment plan due 8/27/2024      Visit Time     Visit Start Time: 2:55 PM  Visit Stop Time: 3:05  PM  Total Visit Duration: 10 minutes    MERCEDES Miramontes  07/31/24

## 2024-07-26 PROBLEM — M79.676: Status: ACTIVE | Noted: 2024-07-26

## 2024-07-26 PROBLEM — Z78.9 OTHER SPECIFIED HEALTH STATUS: Status: ACTIVE | Noted: 2024-07-26

## 2024-07-26 PROBLEM — K08.199 SURGICAL ABSENCE OF TEETH: Status: ACTIVE | Noted: 2024-07-26

## 2024-07-26 PROBLEM — Z11.3 ENCOUNTER FOR SCREENING FOR INFECTIONS WITH A PREDOMINANTLY SEXUAL MODE OF TRANSMISSION: Status: ACTIVE | Noted: 2017-01-13

## 2024-07-26 PROBLEM — M89.8X1 OTHER SPECIFIED DISORDERS OF BONE, SHOULDER: Status: ACTIVE | Noted: 2024-07-26

## 2024-07-26 PROBLEM — L03.019 PARONYCHIA OF FINGER: Status: ACTIVE | Noted: 2024-07-26

## 2024-07-26 PROBLEM — L70.0 ACNE VULGARIS: Status: ACTIVE | Noted: 2024-07-26

## 2024-07-26 PROBLEM — L20.9 ATOPIC DERMATITIS, UNSPECIFIED: Status: ACTIVE | Noted: 2024-07-26

## 2024-07-26 PROBLEM — B08.1 MOLLUSCUM CONTAGIOSUM: Status: ACTIVE | Noted: 2024-07-26

## 2024-07-26 PROBLEM — Z86.19 PERSONAL HISTORY OF OTHER INFECTIOUS AND PARASITIC DISEASES: Status: ACTIVE | Noted: 2024-07-26

## 2024-07-26 PROBLEM — B01.9 VARICELLA WITHOUT COMPLICATION: Status: ACTIVE | Noted: 2024-07-26

## 2024-07-31 ENCOUNTER — TELEMEDICINE (OUTPATIENT)
Dept: PSYCHIATRY | Facility: CLINIC | Age: 19
End: 2024-07-31
Payer: COMMERCIAL

## 2024-07-31 ENCOUNTER — TELEMEDICINE (OUTPATIENT)
Dept: BEHAVIORAL/MENTAL HEALTH CLINIC | Facility: CLINIC | Age: 19
End: 2024-07-31
Payer: COMMERCIAL

## 2024-07-31 DIAGNOSIS — F32.A DEPRESSION, UNSPECIFIED DEPRESSION TYPE: Primary | ICD-10-CM

## 2024-07-31 DIAGNOSIS — F41.9 ANXIETY: Primary | ICD-10-CM

## 2024-07-31 DIAGNOSIS — F41.9 ANXIETY: ICD-10-CM

## 2024-07-31 PROCEDURE — 99212 OFFICE O/P EST SF 10 MIN: CPT

## 2024-07-31 PROCEDURE — 90834 PSYTX W PT 45 MINUTES: CPT | Performed by: COUNSELOR

## 2024-07-31 RX ORDER — ESCITALOPRAM OXALATE 20 MG/1
20 TABLET ORAL DAILY
Qty: 30 TABLET | Refills: 0 | Status: SHIPPED | OUTPATIENT
Start: 2024-07-31

## 2024-08-01 NOTE — PSYCH
"Behavioral Health Psychotherapy Progress Note    Psychotherapy Provided: Individual Psychotherapy     1. Anxiety            Goals addressed in session: Goal 1     DATA: Therapist and client discussed and processed client's feelings regarding her peer, Iglesia, saying that they no longer wanted to engage in sexual activity with client. Therapist and client explored how client's anxious impulse is to ask for more information from Iglesia about why that is, and to ask again if it was due to genitalia preferences. Therapist encouraged client to consider practicing sitting in the anxiety rather than taking immediate action to lower it. Therapist also encouraged client to consider if she truly needed further explanation, or if it would cross a boundary of Iglesia's to not accept their first answer as to the reason, and whether this could lead to Iglesia distancing themselves further. Client agreed to practice trying to sit with and manage the anxiety rather than messaging Iglesia for further answers.    During this session, this clinician used the following therapeutic modalities: Client-centered Therapy, Cognitive Behavioral Therapy, Solution-Focused Therapy, and Supportive Psychotherapy    Substance Abuse was not addressed during this session. If the client is diagnosed with a co-occurring substance use disorder, please indicate any changes in the frequency or amount of use: NA. Stage of change for addressing substance use diagnoses: No substance use/Not applicable    ASSESSMENT:  Eladia Colunga presents with a Anxious mood.     her affect is Normal range and intensity, which is congruent, with her mood and the content of the session. The client has made progress on their goals.     Eladia Colunga presents with a none risk of suicide, none risk of self-harm, and none risk of harm to others.    For any risk assessment that surpasses a \"low\" rating, a safety plan must be developed.    A safety plan was indicated: no  If yes, " describe in detail NA    PLAN: Between sessions, Eladia Colunga will use her coping skills. At the next session, the therapist will use Cognitive Behavioral Therapy to address anxiety.    Behavioral Health Treatment Plan and Discharge Planning: Eladia Colunga is aware of and agrees to continue to work on their treatment plan. They have identified and are working toward their discharge goals. yes    Visit start and stop times:    07/31/24  Start Time: 1403  Stop Time: 1452  Total Visit Time: 49 minutes  Virtual Regular Visit    Verification of patient location:    Patient is located at Home in the following state in which I hold an active license [place]      Assessment/Plan:    Problem List Items Addressed This Visit       Anxiety - Primary       Goals addressed in session: Goal 1          Reason for visit is No chief complaint on file.       Encounter provider JOVI Love      Recent Visits  Date Type Provider Dept   07/31/24 Telemedicine JOVI Love [facility] Therap[facility]   Showing recent visits within past 7 days and meeting all other requirements  Future Appointments  No visits were found meeting these conditions.  Showing future appointments within next 150 days and meeting all other requirements       The patient was identified by name and date of birth. Tommy Colunga was informed that this is a telemedicine visit and that the visit is being conducted throughthe Epic Embedded platform. She agrees to proceed..  My office door was closed. No one else was in the room.  She acknowledged consent and understanding of privacy and security of the video platform. The patient has agreed to participate and understands they can discontinue the visit at any time.    Patient is aware this is a billable service.     Subjective  Tommy Colunga is a 18 y.o. adult  .      HPI     No past medical history on file.    No past surgical history on file.    Current Outpatient Medications   Medication Sig Dispense Refill     escitalopram (LEXAPRO) 20 mg tablet Take 1 tablet (20 mg total) by mouth daily 30 tablet 0    estradiol (ESTRACE) 2 MG tablet Take 2 mg by mouth      HYDROcodone-acetaminophen (NORCO) 5-325 mg per tablet TAKE 1 TABLET EVERY 6 HOURS AS NEEDED FOR SEVERE PAIN (SCALE 7-10)      Progesterone 100 MG CAPS Take 100 mg by mouth      spironolactone (ALDACTONE) 50 mg tablet Take 50 mg by mouth       No current facility-administered medications for this visit.        Allergies   Allergen Reactions    Other Other (See Comments)     NA       Review of Systems    Video Exam    There were no vitals filed for this visit.    Physical Exam     07/31/24  Start Time: 1403  Stop Time: 1452  Total Visit Time: 49 minutes

## 2024-08-06 ENCOUNTER — SOCIAL WORK (OUTPATIENT)
Dept: BEHAVIORAL/MENTAL HEALTH CLINIC | Facility: CLINIC | Age: 19
End: 2024-08-06
Payer: COMMERCIAL

## 2024-08-06 DIAGNOSIS — F41.9 ANXIETY: Primary | ICD-10-CM

## 2024-08-06 PROCEDURE — 90834 PSYTX W PT 45 MINUTES: CPT | Performed by: COUNSELOR

## 2024-08-06 NOTE — PSYCH
"Behavioral Health Psychotherapy Progress Note    Psychotherapy Provided: Individual Psychotherapy     1. Anxiety            Goals addressed in session: Goal 1     DATA: Client shared that she is considering reaching out to Iglesia because she would like to tell them about her new tattoo, however, client also expressed that she is not yet ready to speak with Iglesia regularly. Therapist and client discussed the possibility of waiting to reach out to Iglesia if she is not yet ready to have further conversation with her that isn't about her new tattoo. Therapist and client continued to discuss client's relationship with her parents, which client stated has improved slightly since coming out to them.    During this session, this clinician used the following therapeutic modalities: Client-centered Therapy, Solution-Focused Therapy, and Supportive Psychotherapy    Substance Abuse was not addressed during this session. If the client is diagnosed with a co-occurring substance use disorder, please indicate any changes in the frequency or amount of use: NA. Stage of change for addressing substance use diagnoses: No substance use/Not applicable    ASSESSMENT:  Eladia Colunga presents with a Euthymic/ normal mood.     her affect is Normal range and intensity, which is congruent, with her mood and the content of the session. The client has made progress on their goals.     Eladia Colunga presents with a none risk of suicide, none risk of self-harm, and none risk of harm to others.    For any risk assessment that surpasses a \"low\" rating, a safety plan must be developed.    A safety plan was indicated: no  If yes, describe in detail NA    PLAN: Between sessions, Eladia Colunga will use her coping skills. At the next session, the therapist will use Cognitive Behavioral Therapy to address anxiety.    Behavioral Health Treatment Plan and Discharge Planning: Eladia Colunga is aware of and agrees to continue to work on their treatment plan. They " have identified and are working toward their discharge goals. yes    Visit start and stop times:    08/06/24  Start Time: 1005  Stop Time: 1047  Total Visit Time: 42 minutes

## 2024-08-14 ENCOUNTER — TELEMEDICINE (OUTPATIENT)
Dept: BEHAVIORAL/MENTAL HEALTH CLINIC | Facility: CLINIC | Age: 19
End: 2024-08-14
Payer: COMMERCIAL

## 2024-08-14 DIAGNOSIS — F41.9 ANXIETY: Primary | ICD-10-CM

## 2024-08-14 PROCEDURE — 90834 PSYTX W PT 45 MINUTES: CPT | Performed by: COUNSELOR

## 2024-08-16 NOTE — PSYCH
"Behavioral Health Psychotherapy Progress Note    Psychotherapy Provided: Individual Psychotherapy     1. Anxiety            Goals addressed in session: Goal 1     DATA: Client shared that she has begun an intimate relationship with a coworker/friend. Client expressed that she feels comfortable in this dynamic, but client stated that she has been clear with her partner that she does not want a relationship. Client has been spending a lot of time with this new partner. Client shared that she was feeling sick. Client shared that she will be receiving a promotion at work which therapist and client celebrated.    During this session, this clinician used the following therapeutic modalities: Client-centered Therapy and Supportive Psychotherapy    Substance Abuse was not addressed during this session. If the client is diagnosed with a co-occurring substance use disorder, please indicate any changes in the frequency or amount of use: NA. Stage of change for addressing substance use diagnoses: No substance use/Not applicable    ASSESSMENT:  Eladia Colunga presents with a Euthymic/ normal mood.     her affect is Normal range and intensity, which is congruent, with her mood and the content of the session. The client has made progress on their goals.     Eladia Colunga presents with a none risk of suicide, none risk of self-harm, and none risk of harm to others.    For any risk assessment that surpasses a \"low\" rating, a safety plan must be developed.    A safety plan was indicated: no  If yes, describe in detail NA    PLAN: Between sessions, Eladia Colunga will use her coping skills. At the next session, the therapist will use Client-centered Therapy to address relationships.    Behavioral Health Treatment Plan and Discharge Planning: Eladia Colunga is aware of and agrees to continue to work on their treatment plan. They have identified and are working toward their discharge goals. yes    Visit start and stop times:    08/14/24  Start " Time: 1400  Stop Time: 1445  Total Visit Time: 45 minutes  Virtual Regular Visit    Verification of patient location:    Patient is located at Home in the following state in which I hold an active license [place]      Assessment/Plan:    Problem List Items Addressed This Visit       Anxiety - Primary       Goals addressed in session: Goal 1          Reason for visit is No chief complaint on file.       Encounter provider JOVI Love      Recent Visits  Date Type Provider Dept   08/14/24 Telemedicine JOVI Love [facility] [place] Therapist [facility]   Showing recent visits within past 7 days and meeting all other requirements  Future Appointments  No visits were found meeting these conditions.  Showing future appointments within next 150 days and meeting all other requirements       The patient was identified by name and date of birth. Tommy Colunga was informed that this is a telemedicine visit and that the visit is being conducted throughthe Epic Embedded platform. She agrees to proceed..  My office door was closed. No one else was in the room.  She acknowledged consent and understanding of privacy and security of the video platform. The patient has agreed to participate and understands they can discontinue the visit at any time.    Patient is aware this is a billable service.     Subjective  Tommy Colunga is a 18 y.o. adult  .      HPI     No past medical history on file.    No past surgical history on file.    Current Outpatient Medications   Medication Sig Dispense Refill    escitalopram (LEXAPRO) 20 mg tablet Take 1 tablet (20 mg total) by mouth daily 30 tablet 0    estradiol (ESTRACE) 2 MG tablet Take 2 mg by mouth      HYDROcodone-acetaminophen (NORCO) 5-325 mg per tablet TAKE 1 TABLET EVERY 6 HOURS AS NEEDED FOR SEVERE PAIN (SCALE 7-10)      Progesterone 100 MG CAPS Take 100 mg by mouth      spironolactone (ALDACTONE) 50 mg tablet Take 50 mg by mouth       No current facility-administered medications for this  visit.        Allergies   Allergen Reactions    Other Other (See Comments)     NA       Review of Systems    Video Exam    There were no vitals filed for this visit.    Physical Exam     08/14/24  Start Time: 1400  Stop Time: 1445  Total Visit Time: 45 minutes

## 2024-08-19 NOTE — PSYCH
"PSYCHIATRIC EVALUATION     [facility]    This note was not shared with the patient due to reasonable likelihood of causing patient harm       Name and Date of Birth:  Tommy Colunga 18 y.o. 2005 (Eladia)    Date of Visit: 8/28/2024    Reason for visit:  follow-up for medication management     subjective:  Medication compliance: yes  Medication side effects: Denies    HPI   Eladia is a 18 y.o. adult  (prefers pronouns She/Her) with a history of Adjustment Disorder, with mixed anxiety and depressed mood, who presents for follow-up for medication management.  Currently being observed on Lexapro 20 mg daily.  Eladia is currently connected to outpatient therapy with Dorene Monge at [facility] [place]. No additional services at this time.     Eladia reports medication compliance and denies any side effects at this time.  She continues outpatient therapy with Cleo Monge.  We discussed the new tattoo she has on her left forearm in relation to mine craft videogames.  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  She states he going to visit [place] to visit friends this summer as she took a semester off due to her mental health.  She does plan to go back and continue studying mechanical engineering. Eladia reports her mood is \"definitely better\".  She states she has less depressive intrusive thoughts and less anxiety.  Sleep and appetite remain adequate.  Energy levels and motivation are good.  She denies any suicidal thoughts, plan, or intent.  She denies HI, AH, or VH. Eladia does not Dors any aggression, irritability, or mood swings without a cause.  Denies frequent crying spells or any periods of isac or elevated moods. Eladia denies any panic attacks.  She rates her anxiety a 2-3/10 on the severity scale with 10 being the most severe.  She rates her depression a 2/10 on the severity scale with 10 being the most " severe.  No medication changes at this time    HPI ROS Appetite Changes and Sleep: normal sleep, adequate number of sleep hours, normal appetite, adequate appetite, decreased energy    Psychiatric Review Of Systems:    Sleep changes: no change  Appetite changes:  Adequate  Weight changes: no change  Energy/anergy:  Improved  Interest/pleasure/anhedonia: no change  Somatic symptoms: no  Anxiety/panic: yes, but improved  Amy: no  Guilty/hopeless: yes  Self injurious behavior/risky behavior: history of punching self in legs  Suicidal ideation: yes, fleeting thoughts that come and go, no plan or intent, feels safe, will go to the emergency room if warranted  Homicidal ideation: no  Auditory hallucinations: no  Visual hallucinations: no  Other hallucinations: no  Delusional thinking: no  Eating disorder history: no  Obsessive/compulsive symptoms: no    Review Of Systems:    Mood Anxiety and Depression   Behavior Normal    Thought Content Normal   General Normal    Personality Normal   Other Psych Symptoms Normal   Constitutional negative   ENT negative   Cardiovascular negative   Respiratory negative   Gastrointestinal negative   Genitourinary negative   Musculoskeletal negative   Integumentary negative   Neurological negative   Endocrine negative   Other Symptoms none     Allergies:   NKDA     Past Surgical History:  Collar bone - plates and screws   [place] teeth extraction     Past Medical History:   Denies  Seizure history- Denies    Past Psychiatric History:   Past Inpatient Psychiatric Treatment:   No history of past inpatient psychiatric admissions  Past Outpatient Psychiatric Treatment:    In therapy with Dorene Granados  Past Suicide Attempts: no  Past Violent Behavior: no  Past Psychiatric Medication Trials: None    Family Psychiatric History:   Sister- depression/anxiety  Paternal Grandmother- anxiety     Family History:  History reviewed. No pertinent family history.    Social  History:  Lives with parents and sister  Single   Kids-o  Occupation- PT at [employer]   Hobbies- Video games, reading, rock climbing, long board- how he broke collar bone     Still enjoy (Anhedonia)-Depends- can't focus  [employer] [facility] [employer]    Substance Abuse History:   Occasional Marijuana- Edibles- last used month and a half ago      Social History     Substance and Sexual Activity   Drug Use Not on file     Social History     Socioeconomic History    Marital status: Single     Spouse name: Not on file    Number of children: Not on file    Years of education: Not on file    Highest education level: Not on file   Occupational History    Not on file   Tobacco Use    Smoking status: Never    Smokeless tobacco: Never   Substance and Sexual Activity    Alcohol use: Not on file    Drug use: Not on file    Sexual activity: Not on file   Other Topics Concern    Not on file   Social History Narrative    Not on file     Social Determinants of Health     Financial Resource Strain: Low Risk  (4/11/2024)    Received from [employer]    Overall Financial Resource Strain (CARDIA)     Difficulty of Paying Living Expenses: Not hard at all   Food Insecurity: No Food Insecurity (4/11/2024)    Received from [employer]    Hunger Vital Sign     Worried About Running Out of Food in the Last Year: Never true     Ran Out of Food in the Last Year: Never true   Transportation Needs: No Transportation Needs (4/11/2024)    Received from [employer]    PRAPARE - Transportation     Lack of Transportation (Medical): No     Lack of Transportation (Non-Medical): No   Physical Activity: Not on file   Stress: Not on file   Social Connections: Not on file   Intimate Partner Violence: Not on file   Housing Stability: Unknown (4/11/2024)    Received from [employer]    Housing Stability Vital Sign     Unable to Pay for Housing in the Last Year:  No     Number of Times Moved in the Last Year: Not on file     Homeless in the Last Year: Not on file     Social History     Social History Narrative    Not on file     Traumatic History:   Abuse: Denies  Other Traumatic Events: Rather not talk about it    History Review:  The following portions of the patient's history were reviewed and updated as appropriate: allergies, current medications, past family history, past medical history, past social history, past surgical history, and problem list.     OBJECTIVE:     Mental Status Evaluation:    Appearance age appropriate, casually dressed, dressed appropriately, adequate grooming   Behavior pleasant, cooperative, calm   Speech normal rate and volume   Mood depressed, anxious   Affect normal range and intensity, appropriate   Thought Processes organized, logical, coherent, goal directed   Associations intact associations   Thought Content normal   Perceptual Disturbances: none   Abnormal Thoughts  Risk Potential Suicidal ideation - None at present  Homicidal ideation - None  Potential for aggression - No   Orientation oriented to person, place, time/date, and situation   Memory recent and remote memory grossly intact   Cosciousness alert and awake   Attention Span attention span and concentration are age appropriate   Intellect Appears to be Above Average Intelligence   Insight good   Judgement good   Muscle Strength and  Gait normal muscle strength and normal muscle tone, normal gait and normal balance   Language Age appropriate   Fund of Knowledge Age appropriate   Pain none   Pain Scale N/A       Laboratory Results: No results found for this or any previous visit.    Assessment/Plan:     Impression:  Depression  Anxiety     Continue Lexapro to 20 mg daily for anxiety and depression  Recommend to continue outpatient therapy at [facility]  Recommended psychological testing for ADHD diagnoses- Advised to contact insurance company   Medical follow up with PCP as  needed  Aware of 24 hour and weekend coverage for urgent situations accessed by calling [facility] Outpatient main practice number  Follow up in 2 months      Diagnoses:     Diagnoses and all orders for this visit:    Depression, unspecified depression type  -     escitalopram (LEXAPRO) 20 mg tablet; Take 1 tablet (20 mg total) by mouth daily    Anxiety  -     escitalopram (LEXAPRO) 20 mg tablet; Take 1 tablet (20 mg total) by mouth daily           Treatment Recommendations/Precautions:    Risks/Benefits      Risks, Benefits And Possible Side Effects Of Medications:    Risks, benefits, and possible side effects of medications explained to patient and patient verbalizes understanding and agreement for treatment.    Controlled Medication Discussion:     Not applicable    Treatment Plan: Next treatment plan due 2/23/2025     Visit Time     Visit Start Time: 1:25 PM  Visit Stop Time: 1:38  PM  Total Visit Duration: 13 minutes    MERCEDES Miramontes  08/28/24

## 2024-08-23 ENCOUNTER — SOCIAL WORK (OUTPATIENT)
Dept: BEHAVIORAL/MENTAL HEALTH CLINIC | Facility: CLINIC | Age: 19
End: 2024-08-23
Payer: COMMERCIAL

## 2024-08-23 DIAGNOSIS — F41.9 ANXIETY: ICD-10-CM

## 2024-08-23 DIAGNOSIS — F43.23 ADJUSTMENT DISORDER WITH MIXED ANXIETY AND DEPRESSED MOOD: Primary | ICD-10-CM

## 2024-08-23 DIAGNOSIS — F32.A DEPRESSION, UNSPECIFIED DEPRESSION TYPE: ICD-10-CM

## 2024-08-23 DIAGNOSIS — F64.9 GENDER DYSPHORIA: ICD-10-CM

## 2024-08-23 PROCEDURE — 90834 PSYTX W PT 45 MINUTES: CPT | Performed by: COUNSELOR

## 2024-08-23 NOTE — PSYCH
Behavioral Health Psychotherapy Progress Note    Psychotherapy Provided: Individual Psychotherapy     1. Adjustment disorder with mixed anxiety and depressed mood        2. Anxiety        3. Depression, unspecified depression type        4. Gender dysphoria            Goals addressed in session: Goal 1     DATA: Therapist and client updated client's treatment plan. Client shared that a friend/coworker that they have been being intimate with regularly has voiced that she would like to be in a relationship and to be exclusive. Client appeared to be trying to force the relationship and to convince herself that this is what she wanted to. When therapist pointed this out to client, she acknowledged that this was the case. Client expressed that for nearly all of her past relationships that the other person has pursued her and that she decided to go along with a relationship with them because she did not want to miss out on an opportunity to date, but that she was never sure that she wanted to date them. Therapist and client discussed the importance of client deciding what she wants for herself, rather than going along with what someone else wants for her.    During this session, this clinician used the following therapeutic modalities: Client-centered Therapy, Cognitive Behavioral Therapy, Gender Affirmation Therapy, Solution-Focused Therapy, and Supportive Psychotherapy    Substance Abuse was not addressed during this session. If the client is diagnosed with a co-occurring substance use disorder, please indicate any changes in the frequency or amount of use: NA. Stage of change for addressing substance use diagnoses: No substance use/Not applicable    ASSESSMENT:  Eladia Colunga presents with a Euthymic/ normal mood.     her affect is Normal range and intensity, which is congruent, with her mood and the content of the session. The client has made progress on their goals.     Eladia Colunga presents with a none risk of suicide,  "none risk of self-harm, and none risk of harm to others.    For any risk assessment that surpasses a \"low\" rating, a safety plan must be developed.    A safety plan was indicated: no  If yes, describe in detail NA    PLAN: Between sessions, Eladia Colunga will use her coping skills. At the next session, the therapist will use Cognitive Behavioral Therapy to address anxious thoughts.    Behavioral Health Treatment Plan and Discharge Planning: Eladia Colunga is aware of and agrees to continue to work on their treatment plan. They have identified and are working toward their discharge goals. yes    Visit start and stop times:    08/23/24  Start Time: 1004  Stop Time: 1054  Total Visit Time: 50 minutes  "

## 2024-08-23 NOTE — BH TREATMENT PLAN
Outpatient Behavioral Health Psychotherapy Treatment Plan    Eladia Colunga  2005     Date of Initial Psychotherapy Assessment: 01/20/23  Date of Current Treatment Plan: 08/23/24  Treatment Plan Target Date: 02/22/25  Treatment Plan Expiration Date: 02/22/25    Diagnosis:   1. Adjustment disorder with mixed anxiety and depressed mood        2. Anxiety        3. Depression, unspecified depression type        4. Gender dysphoria              Area(s) of Need: Anxiety    Long Term Goal 1 (in the client's own words): Lessen level of anxiety    Stage of Change: action    Target Date for completion: 02/22/25     Anticipated therapeutic modalities: Supportive therapy, CBT     People identified to complete this goal: Client, therapist      Objective 1: (identify the means of measuring success in meeting the objective): Develop a list of healthy coping skills, and use them 4 out of 5 times      Objective 2: (identify the means of measuring success in meeting the objective): Discuss ways to challenge and reframe anxious thoughts in 3 out of every 5 sessions       Long Term Goal 2 (in the client's own words): Determine whether a diagnosis of ADHD is appropriate for client    Stage of Change: action    Target Date for completion: 02/22/25     Anticipated therapeutic modalities: Supportive therapy, Psycho-education     People identified to complete this goal: Client, therapist      Objective 1: (identify the means of measuring success in meeting the objective): Discuss the symptoms of ADHD in therapy and determine if these symptoms apply to client     Long Term Goal 3 (in the client's own words): Client will continue to lessen their sense of depression    Stage of Change: action    Target Date for completion: 02/22/25     Anticipated therapeutic modalities: Supportive therapy, Psycho-education, Coping skill developement     People identified to complete this goal: Client, therapist      Objective 1: (identify the means of  measuring success in meeting the objective): Discuss client's triggers and practice challenging depressive thoughts in 3 out of 5 sessions    I am currently under the care of a [facility] psychiatric provider: Yes    My [facility] psychiatric provider is: Daylin Humphries    I am currently taking psychiatric medications: Yes, as prescribed    I feel that I will be ready for discharge from mental health care when I reach the following (measurable goal/objective): Meet goals    For children and adults who have a legal guardian:   Has there been any change to custody orders and/or guardianship status? NA. If yes, attach updated documentation.    I have created my Crisis Plan and have been offered a copy of this plan    Behavioral Health Treatment Plan [facility]: Diagnosis and Treatment Plan explained to Eladia Colunga acknowledges an understanding of their diagnosis. Eladia Colunga agrees to this treatment plan.    I have been offered a copy of this Treatment Plan. yes

## 2024-08-25 PROBLEM — B08.1 MOLLUSCUM CONTAGIOSUM: Status: RESOLVED | Noted: 2024-07-26 | Resolved: 2024-08-25

## 2024-08-25 PROBLEM — Z11.3 ENCOUNTER FOR SCREENING FOR INFECTIONS WITH A PREDOMINANTLY SEXUAL MODE OF TRANSMISSION: Status: RESOLVED | Noted: 2017-01-13 | Resolved: 2024-08-25

## 2024-08-28 ENCOUNTER — SOCIAL WORK (OUTPATIENT)
Dept: BEHAVIORAL/MENTAL HEALTH CLINIC | Facility: CLINIC | Age: 19
End: 2024-08-28
Payer: COMMERCIAL

## 2024-08-28 ENCOUNTER — OFFICE VISIT (OUTPATIENT)
Dept: PSYCHIATRY | Facility: CLINIC | Age: 19
End: 2024-08-28
Payer: COMMERCIAL

## 2024-08-28 DIAGNOSIS — F41.9 ANXIETY: ICD-10-CM

## 2024-08-28 DIAGNOSIS — F32.A DEPRESSION, UNSPECIFIED DEPRESSION TYPE: ICD-10-CM

## 2024-08-28 DIAGNOSIS — F41.9 ANXIETY: Primary | ICD-10-CM

## 2024-08-28 PROCEDURE — 90834 PSYTX W PT 45 MINUTES: CPT | Performed by: COUNSELOR

## 2024-08-28 PROCEDURE — 99212 OFFICE O/P EST SF 10 MIN: CPT

## 2024-08-28 RX ORDER — ESCITALOPRAM OXALATE 20 MG/1
20 TABLET ORAL DAILY
Qty: 30 TABLET | Refills: 2 | Status: SHIPPED | OUTPATIENT
Start: 2024-08-28

## 2024-09-03 ENCOUNTER — SOCIAL WORK (OUTPATIENT)
Dept: BEHAVIORAL/MENTAL HEALTH CLINIC | Facility: CLINIC | Age: 19
End: 2024-09-03
Payer: COMMERCIAL

## 2024-09-03 DIAGNOSIS — F64.9 GENDER DYSPHORIA: ICD-10-CM

## 2024-09-03 DIAGNOSIS — F41.9 ANXIETY: Primary | ICD-10-CM

## 2024-09-03 PROCEDURE — 90834 PSYTX W PT 45 MINUTES: CPT | Performed by: COUNSELOR

## 2024-09-03 NOTE — PSYCH
"Behavioral Health Psychotherapy Progress Note    Psychotherapy Provided: Individual Psychotherapy     1. Anxiety        2. Gender dysphoria            Goals addressed in session: Goal 1     DATA: Client shared that she recently told her peer who she has been being intimate with that she loves her, and that her partner responded that she loves client too. Client expressed that while she loves her partner, that she does not see it working out long-term, and does still not want to be in a relationship with this person. Client shared that she expressed this to her partner, and that they had an open discussion about their wants, needs, and boundaries, which therapist verbally reinforced. Client also shared that she would like to speak to her parents about legally changing her name, as well as the possibility of beginning voice lessons.    During this session, this clinician used the following therapeutic modalities: Client-centered Therapy, Gender Affirmation Therapy, Solution-Focused Therapy, and Supportive Psychotherapy    Substance Abuse was not addressed during this session. If the client is diagnosed with a co-occurring substance use disorder, please indicate any changes in the frequency or amount of use: NA. Stage of change for addressing substance use diagnoses: No substance use/Not applicable    ASSESSMENT:  Eladia Colunga presents with a Euthymic/ normal mood.     her affect is Normal range and intensity, which is congruent, with her mood and the content of the session. The client has made progress on their goals.     Eladia Colunga presents with a none risk of suicide, none risk of self-harm, and none risk of harm to others.    For any risk assessment that surpasses a \"low\" rating, a safety plan must be developed.    A safety plan was indicated: no  If yes, describe in detail NA    PLAN: Between sessions, Eladia Colunga will use her coping skills. At the next session, the therapist will use Gender Affirmation Therapy to " address gender dysphoria.    Behavioral Health Treatment Plan and Discharge Planning: Eladia Colunga is aware of and agrees to continue to work on their treatment plan. They have identified and are working toward their discharge goals. yes    Visit start and stop times:    09/03/24  Start Time: 1002  Stop Time: 1049  Total Visit Time: 47 minutes

## 2024-09-03 NOTE — PSYCH
"Behavioral Health Psychotherapy Progress Note    Psychotherapy Provided: Individual Psychotherapy     1. Anxiety            Goals addressed in session: Goal 1     DATA: Therapist and client continued to discuss and process client's new dynamic with a friend/coworker who have become intimate with each other. Client shared that she had a conversation with this peer explaining that she is not interested in entering a relationship and would like to be able to explore dynamics with others, but would like to continue to be intimate with this peer. This peer stated that while they would like to be in a relationship with client, that they are open to continuing to be intimate with client and should client decide to become intimate with someone else that they would like to be informed. Therapist verbally reinforced client expressing her wants and needs to this peer rather than giving in to what the peer wants. Therapist and client discussed how this interaction felt to client.    During this session, this clinician used the following therapeutic modalities: Client-centered Therapy, Gender Affirmation Therapy, Solution-Focused Therapy, and Supportive Psychotherapy    Substance Abuse was not addressed during this session. If the client is diagnosed with a co-occurring substance use disorder, please indicate any changes in the frequency or amount of use: NA. Stage of change for addressing substance use diagnoses: No substance use/Not applicable    ASSESSMENT:  Eladia Colunga presents with a Euthymic/ normal mood.     her affect is Normal range and intensity, which is congruent, with her mood and the content of the session. The client has made progress on their goals.     Eladia Colunga presents with a none risk of suicide, none risk of self-harm, and none risk of harm to others.    For any risk assessment that surpasses a \"low\" rating, a safety plan must be developed.    A safety plan was indicated: no  If yes, describe in detail " NA    PLAN: Between sessions, Eladia Colunga will use her coping skills. At the next session, the therapist will use Cognitive Behavioral Therapy to address anxious thinking.    Behavioral Health Treatment Plan and Discharge Planning: Eladia Colunga is aware of and agrees to continue to work on their treatment plan. They have identified and are working toward their discharge goals. yes    Visit start and stop times:    08/28/24  Start Time: 1403  Stop Time: 1453  Total Visit Time: 50 minutes

## 2024-09-11 ENCOUNTER — SOCIAL WORK (OUTPATIENT)
Dept: BEHAVIORAL/MENTAL HEALTH CLINIC | Facility: CLINIC | Age: 19
End: 2024-09-11
Payer: COMMERCIAL

## 2024-09-11 DIAGNOSIS — F43.23 ADJUSTMENT DISORDER WITH MIXED ANXIETY AND DEPRESSED MOOD: Primary | ICD-10-CM

## 2024-09-11 PROCEDURE — 90834 PSYTX W PT 45 MINUTES: CPT | Performed by: COUNSELOR

## 2024-09-16 NOTE — PSYCH
"Behavioral Health Psychotherapy Progress Note    Psychotherapy Provided: Individual Psychotherapy     1. Adjustment disorder with mixed anxiety and depressed mood            Goals addressed in session: Goal 1     DATA: Client shared that she recently went to visit her college friends. Client shared that the visit had gone well, and that she had spent time with Iglesia, her previous situationship. Client shared that Iglesia had asked to speak with her and had apologized for how things had gone between them. Iglesia also asked client to speak again at a later point during client's trip about their situation, and client shared that the conversation had been open and had gone well, which therapist verbally reinforced.    During this session, this clinician used the following therapeutic modalities: Client-centered Therapy and Supportive Psychotherapy    Substance Abuse was not addressed during this session. If the client is diagnosed with a co-occurring substance use disorder, please indicate any changes in the frequency or amount of use: NA. Stage of change for addressing substance use diagnoses: No substance use/Not applicable    ASSESSMENT:  Eladia Colunga presents with a Euthymic/ normal mood.     her affect is Normal range and intensity, which is congruent, with her mood and the content of the session. The client has made progress on their goals.     Eladia Colunga presents with a none risk of suicide, none risk of self-harm, and none risk of harm to others.    For any risk assessment that surpasses a \"low\" rating, a safety plan must be developed.    A safety plan was indicated: no  If yes, describe in detail NA    PLAN: Between sessions, Eladia Colunga will use her coping skills. At the next session, the therapist will use Cognitive Behavioral Therapy to address anxious thoughts.    Behavioral Health Treatment Plan and Discharge Planning: Eladia Colunga is aware of and agrees to continue to work on their treatment plan. They " have identified and are working toward their discharge goals. yes    Visit start and stop times:    09/11/24  Start Time: 1403  Stop Time: 1455  Total Visit Time: 52 minutes

## 2024-09-17 ENCOUNTER — SOCIAL WORK (OUTPATIENT)
Dept: BEHAVIORAL/MENTAL HEALTH CLINIC | Facility: CLINIC | Age: 19
End: 2024-09-17
Payer: COMMERCIAL

## 2024-09-17 DIAGNOSIS — F43.23 ADJUSTMENT DISORDER WITH MIXED ANXIETY AND DEPRESSED MOOD: Primary | ICD-10-CM

## 2024-09-17 PROCEDURE — 90834 PSYTX W PT 45 MINUTES: CPT | Performed by: COUNSELOR

## 2024-09-23 NOTE — PSYCH
"Behavioral Health Psychotherapy Progress Note    Psychotherapy Provided: Individual Psychotherapy     1. Adjustment disorder with mixed anxiety and depressed mood            Goals addressed in session: Goal 1     DATA: Client expressed that having recently seen her ex, Iglesia, had reignited her feelings for Iglesia, and she realized that she had not moved as far past her feelings for Iglesia as she had previously thought. Client explained that being polyamorous, that she can still love her new partner while still having feelings for Iglesia. Client expressed that she had explained this to her new partner. Client expressed that she would like to move on from Iglesia faster, and therapist normalized these feelings while explaining that it takes time and that there is not a pill that she can take or activity that she can do that will make her feelings for Iglesia disappear.     During this session, this clinician used the following therapeutic modalities: Client-centered Therapy, Cognitive Behavioral Therapy, Solution-Focused Therapy, and Supportive Psychotherapy    Substance Abuse was not addressed during this session. If the client is diagnosed with a co-occurring substance use disorder, please indicate any changes in the frequency or amount of use: NA. Stage of change for addressing substance use diagnoses: No substance use/Not applicable    ASSESSMENT:  Eladia Colunga presents with a Anxious mood.     her affect is Normal range and intensity, which is congruent, with her mood and the content of the session. The client has made progress on their goals.     Eladia Colunga presents with a none risk of suicide, none risk of self-harm, and none risk of harm to others.    For any risk assessment that surpasses a \"low\" rating, a safety plan must be developed.    A safety plan was indicated: no  If yes, describe in detail NA    PLAN: Between sessions, Eladia Colunga will use her coping skills. At the next session, the therapist will use " Client-centered Therapy and Cognitive Behavioral Therapy to address anxiety.    Behavioral Health Treatment Plan and Discharge Planning: Eladia Colunga is aware of and agrees to continue to work on their treatment plan. They have identified and are working toward their discharge goals. yes    Visit start and stop times:    09/17/24  Start Time: 1002  Stop Time: 1050  Total Visit Time: 48 minutes

## 2024-09-25 ENCOUNTER — TELEPHONE (OUTPATIENT)
Dept: PSYCHIATRY | Facility: CLINIC | Age: 19
End: 2024-09-25

## 2024-09-25 ENCOUNTER — SOCIAL WORK (OUTPATIENT)
Dept: BEHAVIORAL/MENTAL HEALTH CLINIC | Facility: CLINIC | Age: 19
End: 2024-09-25
Payer: COMMERCIAL

## 2024-09-25 DIAGNOSIS — F41.9 ANXIETY: Primary | ICD-10-CM

## 2024-09-25 PROCEDURE — 90834 PSYTX W PT 45 MINUTES: CPT | Performed by: COUNSELOR

## 2024-09-25 NOTE — TELEPHONE ENCOUNTER
Writer called and rescheduled October follow up with Daylin Humphries to 12 pm 10/30/24 due to provider's schedule change. Client can make new appointment time.

## 2024-09-30 NOTE — PSYCH
"Behavioral Health Psychotherapy Progress Note    Psychotherapy Provided: Individual Psychotherapy     1. Anxiety            Goals addressed in session: Goal 1     DATA: Therapist and client discussed what client feels went wrong during her first year of college that caused her to fail most of her classes and have to take a semester off. Therapist and client discussed how client's gender dysphoria and having to go off of her hormones for a time period negative impacted her mental health. Therapist also pointed out that client struggled to resist temptation to spend time with her friends over completing her schoolwork. Client acknowledged that once the schoolwork began to pile up, that it became overwhelming to her, and that she gave up when she felt that she could no longer improve her grades. Client also commented that she only completed schoolwork that was due the following day, rather than working ahead.    During this session, this clinician used the following therapeutic modalities: Client-centered Therapy, Cognitive Behavioral Therapy, Solution-Focused Therapy, and Supportive Psychotherapy    Substance Abuse was not addressed during this session. If the client is diagnosed with a co-occurring substance use disorder, please indicate any changes in the frequency or amount of use: NA. Stage of change for addressing substance use diagnoses: No substance use/Not applicable    ASSESSMENT:  Eladia Colunga presents with a Euthymic/ normal and Anxious mood.     her affect is Normal range and intensity, which is congruent, with her mood and the content of the session. The client has made progress on their goals.     Eladia Colunga presents with a none risk of suicide, none risk of self-harm, and none risk of harm to others.    For any risk assessment that surpasses a \"low\" rating, a safety plan must be developed.    A safety plan was indicated: no  If yes, describe in detail NA    PLAN: Between sessions, Eladia Colunga will use " her coping skills. At the next session, the therapist will use Cognitive Behavioral Therapy to address anxiety.    Behavioral Health Treatment Plan and Discharge Planning: Eladia Colunga is aware of and agrees to continue to work on their treatment plan. They have identified and are working toward their discharge goals. yes    Visit start and stop times:    09/25/24  Start Time: 1403  Stop Time: 1452  Total Visit Time: 49 minutes

## 2024-10-01 ENCOUNTER — SOCIAL WORK (OUTPATIENT)
Dept: BEHAVIORAL/MENTAL HEALTH CLINIC | Facility: CLINIC | Age: 19
End: 2024-10-01
Payer: COMMERCIAL

## 2024-10-01 DIAGNOSIS — F41.9 ANXIETY: Primary | ICD-10-CM

## 2024-10-01 PROCEDURE — 90834 PSYTX W PT 45 MINUTES: CPT | Performed by: COUNSELOR
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
## 2024-10-09 ENCOUNTER — SOCIAL WORK (OUTPATIENT)
Dept: BEHAVIORAL/MENTAL HEALTH CLINIC | Facility: CLINIC | Age: 19
End: 2024-10-09
Payer: COMMERCIAL

## 2024-10-09 DIAGNOSIS — F41.9 ANXIETY: Primary | ICD-10-CM

## 2024-10-09 PROCEDURE — 90834 PSYTX W PT 45 MINUTES: CPT | Performed by: COUNSELOR

## 2024-10-14 NOTE — PSYCH
"Behavioral Health Psychotherapy Progress Note    Psychotherapy Provided: Individual Psychotherapy     1. Anxiety            Goals addressed in session: Goal 1 and Goal 2     DATA: Client shared that her insurance will pay for ADHD and ASD testing, so therapist and client discussed the next steps. Therapist and client continued to process client's feelings of returning to college for the spring semester. Therapist and client discussed areas that client struggled with during last school year, and what skills she can implement moving forward in order to help her to succeed at college.    During this session, this clinician used the following therapeutic modalities: Client-centered Therapy, Cognitive Behavioral Therapy, Solution-Focused Therapy, and Supportive Psychotherapy    Substance Abuse was not addressed during this session. If the client is diagnosed with a co-occurring substance use disorder, please indicate any changes in the frequency or amount of use: NA. Stage of change for addressing substance use diagnoses: No substance use/Not applicable    ASSESSMENT:  Eladia Colunga presents with a Euthymic/ normal mood.     her affect is Normal range and intensity, which is congruent, with her mood and the content of the session. The client has made progress on their goals.     Eladia Colunga presents with a none risk of suicide, none risk of self-harm, and none risk of harm to others.    For any risk assessment that surpasses a \"low\" rating, a safety plan must be developed.    A safety plan was indicated: no  If yes, describe in detail NA    PLAN: Between sessions, Eladia Colunga will use her coping skills. At the next session, the therapist will use Cognitive Behavioral Therapy to address anxiety.    Behavioral Health Treatment Plan and Discharge Planning: Eladia Colunga is aware of and agrees to continue to work on their treatment plan. They have identified and are working toward their discharge goals. yes    Visit start and " stop times:    10/09/24  Start Time: 1402  Stop Time: 1449  Total Visit Time: 47 minutes

## 2024-10-15 ENCOUNTER — SOCIAL WORK (OUTPATIENT)
Dept: BEHAVIORAL/MENTAL HEALTH CLINIC | Facility: CLINIC | Age: 19
End: 2024-10-15
Payer: COMMERCIAL

## 2024-10-15 DIAGNOSIS — F32.A DEPRESSION, UNSPECIFIED DEPRESSION TYPE: ICD-10-CM

## 2024-10-15 DIAGNOSIS — F41.9 ANXIETY: Primary | ICD-10-CM

## 2024-10-15 DIAGNOSIS — F64.9 GENDER DYSPHORIA: ICD-10-CM

## 2024-10-15 PROCEDURE — 90834 PSYTX W PT 45 MINUTES: CPT | Performed by: COUNSELOR

## 2024-10-21 NOTE — PSYCH
"Behavioral Health Psychotherapy Progress Note    Psychotherapy Provided: Individual Psychotherapy     1. Anxiety        2. Depression, unspecified depression type        3. Gender dysphoria            Goals addressed in session: Goal 1 and Goal 3      DATA: Therapist and client discussed struggles that prevented client from discuss during her freshman year of college. Therapist and client discussed how anxiety previously led to her procrastinating on her schoolwork until it piled up and felt overwhelming which led to her shutting down and not completing it at all. Therapist also pointed out that client struggled with having the level of maturity necessary to make decisions such as completing her schoolwork rather than spending time with friends and getting high on marijuana. Therapist and client discussed ways that client can practice making these types of decisions now, such as choosing to sleep when she has to get up early rather than spending time with her partner until into the early morning hours.    During this session, this clinician used the following therapeutic modalities: Client-centered Therapy, Cognitive Behavioral Therapy, Mindfulness-based Strategies, Solution-Focused Therapy, and Supportive Psychotherapy    Substance Abuse was not addressed during this session. If the client is diagnosed with a co-occurring substance use disorder, please indicate any changes in the frequency or amount of use: NA. Stage of change for addressing substance use diagnoses: No substance use/Not applicable    ASSESSMENT:  Eladia Colunga presents with a Euthymic/ normal mood.     her affect is Normal range and intensity, which is congruent, with her mood and the content of the session. The client has made progress on their goals.     Eladia Colunga presents with a none risk of suicide, none risk of self-harm, and none risk of harm to others.    For any risk assessment that surpasses a \"low\" rating, a safety plan must be " developed.    A safety plan was indicated: no  If yes, describe in detail NA    PLAN: Between sessions, Eladia Colunga will use her coping skills. At the next session, the therapist will use Client-centered Therapy and Cognitive Behavioral Therapy to address decision making.    Behavioral Health Treatment Plan and Discharge Planning: Eladia Colunga is aware of and agrees to continue to work on their treatment plan. They have identified and are working toward their discharge goals. yes    Visit start and stop times:    10/15/24  Start Time: 1000  Stop Time: 1047  Total Visit Time: 47 minutes

## 2024-10-21 NOTE — PSYCH
Psychiatric Medication Management - Behavioral Health   Tommy Colunga 18 y.o. adult MRN: [id number]    This note was not shared with the patient due to reasonable likelihood of causing patient harm     Reason for Visit: Follow-up for medication management    Date of visit: 10/30/2024    Assessment & Plan  Depression, unspecified depression type    Orders:    escitalopram (LEXAPRO) 20 mg tablet; Take 1 tablet (20 mg total) by mouth daily    Anxiety    Orders:    escitalopram (LEXAPRO) 20 mg tablet; Take 1 tablet (20 mg total) by mouth daily         Assessment/Plan:     Impression:  Depression  Anxiety     Continue Lexapro 20 mg daily for anxiety and depression  Recommend to continue outpatient therapy at [facility]  Recommended psychological testing for ADHD diagnoses- Advised to contact insurance company   Medical follow up with PCP as needed  Aware of 24 hour and weekend coverage for urgent situations accessed by calling [facility] main practice number  Follow up in 3 months     Treatment Plan: Next treatment plan due 2/23/20258.     Treatment Recommendations:      Risks, Benefits And Possible Side Effects Of Medications:  Risks, benefits, and possible side effects of medications explained to patient and family, they verbalize understanding and Reviewed risks/benefits and side effects of antidepressant medications including black box warning on antidepressants, patient and family verbalize understanding.    Controlled Medication Discussion: No records found for controlled prescriptions according to [place] Prescription Drug Monitoring Program.        Subjective:  Medication compliance: yes  Medication side effects: Denies    HPI   Eladia is a 18 y.o. adult  (prefers pronouns She/Her) with a history of Adjustment Disorder, with mixed anxiety and depressed mood, who presents for follow-up for medication management.  Currently being observed on Lexapro 20 mg daily.   "Eladia is currently connected to outpatient therapy with Dorene Monge at [facility]. No additional services at this time.     Eladia reports medication compliance and denies any side effects at this time.  She reports she has a new girlfriend named Yvette and they are now dating monogamously.  Yvette works at the escape [employer] or may have works.  Eladia appears more engaged in conversation today throughout the interview.  She reports her mood is \"good\".  Sleep is adequate, however she is going to bed later that she wants to.  Energy levels are low and Eladia reports she feels tired a lot.  Motivation is okay.  Appetite remains adequate, however Eladia reports she feels her stomach is more sensitive now.  She denies nausea or vomiting. Eladia adamantly denies any suicidal thoughts, plan, or intent.  She denies HI, AH, or VH.  She does not endorse any mood swings, irritability, frequent crying spells or any periods of isac or elevated mood.  Eladia rates her anxiety a 3/10 on the severity scale with 10 being the most severe and she rates her depression a 2-3/10 on the severity scale with 10 being the most severe.  Eladia states she is not feeling sad just feels lower motivation.  No medication changes at this time.    Review Of Systems:     Constitutional Negative   ENT Negative   Cardiovascular Negative   Respiratory Negative   Gastrointestinal Negative   Genitourinary Negative   Musculoskeletal Negative   Integumentary Negative   Neurological Negative   Endocrine Negative     Past Medical History:   Patient Active Problem List   Diagnosis    Adjustment disorder with mixed anxiety and depressed mood    Displaced fracture of shaft of left clavicle, initial encounter for closed fracture    History of varicella as a child    Melanocytic nevi, unspecified    Retinal telangiectasis, right eye    Presence of spectacles and contact lenses    Anxiety    Depression    Gender dysphoria    Pain in unspecified toe(s)    Personal " "history of other infectious and parasitic diseases    Surgical absence of teeth    Varicella without complication    Paronychia of finger    Acne vulgaris    Atopic dermatitis, unspecified    Other specified disorders of bone, shoulder    Other specified health status   Seizure history- Denies     Allergies:   Allergies   Allergen Reactions    Other Other (See Comments)     NA       Past Surgical History:   Collar bone - plates and screws   Neversink teeth extraction    Past Psychiatric History:   Past Inpatient Psychiatric Treatment:   No history of past inpatient psychiatric admissions  Past Outpatient Psychiatric Treatment:    In therapy with Dorene Granados  Past Suicide Attempts: no  Past Violent Behavior: no  Past Psychiatric Medication Trials: None    Family Psychiatric History:   Sister- depression/anxiety  Paternal Grandmother- anxiety     Social History:   Lives with parents and sister  Single   Kids-o  Occupation- PT at [employer]   Hobbies- Video games, reading, rock climbing, long board- how he broke collar bone                                 Still enjoy (Anhedonia)-Depends- can't focus  [employer]    Substance Abuse History:   Occasional Marijuana- Edibles- last used month and a half ago     Traumatic History:   Abuse: Denies  Other Traumatic Events: Rather not talk about it    The following portions of the patient's history were reviewed and updated as appropriate: allergies, current medications, past family history, past medical history, past social history, past surgical history, and problem list.    Objective:  There were no vitals filed for this visit.      Weight (last 2 days)       None            Labs:   No results found for this or any previous visit.    Mental status:  Appearance sitting comfortably in chair, dressed in casual clothing, adequate hygiene and grooming, cooperative with interview, good eye contact   Mood \"Good\"   Affect Appears " generally euthymic, stable, mood-congruent   Speech Normal rate, rhythm, and volume   Thought Processes Linear and goal directed   Associations intact associations   Hallucinations Denies any auditory or visual hallucinations   Thought Content No passive or active suicidal or homicidal ideation, intent, or plan.   Orientation Oriented to person, place, time, and situation   Recent and Remote Memory Grossly intact   Attention Span and Concentration Concentration intact   Intellect Appears to be of Average Intelligence   Insight Insight intact   Judgement judgment was intact   Muscle Strength Muscle strength and tone were normal and Normal gait    Language Within normal limits   Fund of Knowledge Age appropriate   Pain None     PHQ-A Depression Screening                     Visit Time    Visit Start Time: 12:00 PM  Visit Stop Time: 12:15 PM  Total Visit Duration:  15 minutes    This note may have been written with the assistance of dictation software. Please excuse any grammatical  errors, misspellings,  and abnormal spacing of letters, sentences or paragraphs .     MERCEDES Miramontes  10/30/24

## 2024-10-23 ENCOUNTER — TELEMEDICINE (OUTPATIENT)
Dept: BEHAVIORAL/MENTAL HEALTH CLINIC | Facility: CLINIC | Age: 19
End: 2024-10-23
Payer: COMMERCIAL

## 2024-10-23 DIAGNOSIS — F41.9 ANXIETY: Primary | ICD-10-CM

## 2024-10-23 PROCEDURE — 90834 PSYTX W PT 45 MINUTES: CPT | Performed by: COUNSELOR

## 2024-10-28 NOTE — PSYCH
"Behavioral Health Psychotherapy Progress Note    Psychotherapy Provided: Individual Psychotherapy     1. Anxiety            Goals addressed in session: Goal 1     DATA: Client shared that she had recently decided to enter into a monogamous relationship with her partner, Pauline. Client expressed that she believes that she is alright with the idea of monogamy even though her preference may be polyamory. Client expressed that she was saddened by the idea of the potential of losing her relationship with Pauline once client returns to college in January, and so she decided to enter into a relationship with her.    During this session, this clinician used the following therapeutic modalities: Client-centered Therapy, Cognitive Behavioral Therapy, Solution-Focused Therapy, and Supportive Psychotherapy    Substance Abuse was not addressed during this session. If the client is diagnosed with a co-occurring substance use disorder, please indicate any changes in the frequency or amount of use: NA. Stage of change for addressing substance use diagnoses: No substance use/Not applicable    ASSESSMENT:  Eladia Colunga presents with a Euthymic/ normal mood.     her affect is Normal range and intensity, which is congruent, with her mood and the content of the session. The client has made progress on their goals.     Eladia Colunga presents with a none risk of suicide, none risk of self-harm, and none risk of harm to others.    For any risk assessment that surpasses a \"low\" rating, a safety plan must be developed.    A safety plan was indicated: no  If yes, describe in detail NA    PLAN: Between sessions, Eladia Colunga will use her coping skills. At the next session, the therapist will use Client-centered Therapy and Cognitive Behavioral Therapy to address anxiety.    Behavioral Health Treatment Plan and Discharge Planning: Eladia Colunga is aware of and agrees to continue to work on their treatment plan. They have identified and are working toward " their discharge goals. yes    Visit start and stop times:    10/23/24  Start Time: 1402  Stop Time: 1450  Total Visit Time: 48 minutes  Virtual Regular Visit    Verification of patient location:    Patient is located at Home in the following state in which I hold an active license [place]      Assessment/Plan:    Problem List Items Addressed This Visit       Anxiety - Primary       Goals addressed in session: Goal 1          Reason for visit is No chief complaint on file.       Encounter provider JOVI Love      Recent Visits  Date Type Provider Dept   10/23/24 Telemedicine JOVI Love [facility] Therapist [facility]   Showing recent visits within past 7 days and meeting all other requirements  Future Appointments  No visits were found meeting these conditions.  Showing future appointments within next 150 days and meeting all other requirements       The patient was identified by name and date of birth. Tommy Colunga was informed that this is a telemedicine visit and that the visit is being conducted throughthe Epic Embedded platform. She agrees to proceed..  My office door was closed. No one else was in the room.  She acknowledged consent and understanding of privacy and security of the video platform. The patient has agreed to participate and understands they can discontinue the visit at any time.    Patient is aware this is a billable service.     Subjective  Tommy Colunga is a 18 y.o. adult  .      HPI     No past medical history on file.    No past surgical history on file.    Current Outpatient Medications   Medication Sig Dispense Refill    escitalopram (LEXAPRO) 20 mg tablet Take 1 tablet (20 mg total) by mouth daily 30 tablet 2    estradiol (ESTRACE) 2 MG tablet Take 2 mg by mouth      HYDROcodone-acetaminophen (NORCO) 5-325 mg per tablet TAKE 1 TABLET EVERY 6 HOURS AS NEEDED FOR SEVERE PAIN (SCALE 7-10)      Progesterone 100 MG CAPS Take 100 mg by mouth      spironolactone (ALDACTONE) 50 mg tablet Take  50 mg by mouth       No current facility-administered medications for this visit.        Allergies   Allergen Reactions    Other Other (See Comments)     NA       Review of Systems    Video Exam    There were no vitals filed for this visit.    Physical Exam     10/23/24  Start Time: 1402  Stop Time: 1450  Total Visit Time: 48 minutes

## 2024-10-29 ENCOUNTER — TELEMEDICINE (OUTPATIENT)
Dept: BEHAVIORAL/MENTAL HEALTH CLINIC | Facility: CLINIC | Age: 19
End: 2024-10-29
Payer: COMMERCIAL

## 2024-10-29 DIAGNOSIS — F32.A DEPRESSION, UNSPECIFIED DEPRESSION TYPE: ICD-10-CM

## 2024-10-29 DIAGNOSIS — F41.9 ANXIETY: Primary | ICD-10-CM

## 2024-10-29 PROCEDURE — 90834 PSYTX W PT 45 MINUTES: CPT | Performed by: COUNSELOR

## 2024-10-30 ENCOUNTER — OFFICE VISIT (OUTPATIENT)
Dept: PSYCHIATRY | Facility: CLINIC | Age: 19
End: 2024-10-30
Payer: COMMERCIAL

## 2024-10-30 DIAGNOSIS — F41.9 ANXIETY: ICD-10-CM

## 2024-10-30 DIAGNOSIS — F32.A DEPRESSION, UNSPECIFIED DEPRESSION TYPE: ICD-10-CM

## 2024-10-30 PROCEDURE — 99212 OFFICE O/P EST SF 10 MIN: CPT

## 2024-10-30 RX ORDER — ESCITALOPRAM OXALATE 20 MG/1
20 TABLET ORAL DAILY
Qty: 90 TABLET | Refills: 0 | Status: SHIPPED | OUTPATIENT
Start: 2024-10-30

## 2024-11-01 NOTE — PSYCH
"Behavioral Health Psychotherapy Progress Note    Psychotherapy Provided: Individual Psychotherapy     1. Anxiety        2. Depression, unspecified depression type            Goals addressed in session: Goal 1     DATA: Therapist and client discussed client's upcoming return to college and the resulting anxiety. Therapist and client discussed changes that client can continue to practice in order to prepare her for returning to college, such as creating a schedule for herself. Client continues to struggle to go to bed on time at night, and frequently stays up very late and then is tired the next day. Therapist reminded client that sometimes sticking to her schedule is going to mean not spending as much time with their partner in order to take care of tasks that they need to take care of rather than engaging in the preferred activity.    During this session, this clinician used the following therapeutic modalities: Client-centered Therapy, Cognitive Behavioral Therapy, Solution-Focused Therapy, and Supportive Psychotherapy    Substance Abuse was not addressed during this session. If the client is diagnosed with a co-occurring substance use disorder, please indicate any changes in the frequency or amount of use: NA. Stage of change for addressing substance use diagnoses: No substance use/Not applicable    ASSESSMENT:  Eladia Colunga presents with a Euthymic/ normal mood.     her affect is Normal range and intensity, which is congruent, with her mood and the content of the session. The client has made progress on their goals.     Eladia Colunga presents with a none risk of suicide, none risk of self-harm, and none risk of harm to others.    For any risk assessment that surpasses a \"low\" rating, a safety plan must be developed.    A safety plan was indicated: no  If yes, describe in detail NA    PLAN: Between sessions, Eladia Colunga will use her coping skills. At the next session, the therapist will use Client-centered Therapy, " Cognitive Behavioral Therapy, Solution-Focused Therapy, and Supportive Psychotherapy to address changes for college success.    Behavioral Health Treatment Plan and Discharge Planning: Eladia Colunga is aware of and agrees to continue to work on their treatment plan. They have identified and are working toward their discharge goals. yes    Visit start and stop times:    10/29/24  Start Time: 1002  Stop Time: 1044  Total Visit Time: 42 minutes  Virtual Regular Visit    Verification of patient location:    Patient is located at Home in the following state in which I hold an active license [place]      Assessment/Plan:    Problem List Items Addressed This Visit       Anxiety - Primary    Depression       Goals addressed in session: Goal 1          Reason for visit is No chief complaint on file.       Encounter provider JOVI Love      Recent Visits  Date Type Provider Dept   10/29/24 Telemedicine JOVI Love [facility] Therapist [facility]   Showing recent visits within past 7 days and meeting all other requirements  Future Appointments  No visits were found meeting these conditions.  Showing future appointments within next 150 days and meeting all other requirements       The patient was identified by name and date of birth. Tommy Colunga was informed that this is a telemedicine visit and that the visit is being conducted throughthe Epic Embedded platform. She agrees to proceed..  My office door was closed. No one else was in the room.  She acknowledged consent and understanding of privacy and security of the video platform. The patient has agreed to participate and understands they can discontinue the visit at any time.    Patient is aware this is a billable service.     Subjective  Tommy Colunga is a 18 y.o. adult  .      HPI     No past medical history on file.    No past surgical history on file.    Current Outpatient Medications   Medication Sig Dispense Refill    escitalopram (LEXAPRO) 20 mg tablet Take 1 tablet  (20 mg total) by mouth daily 90 tablet 0    estradiol (ESTRACE) 2 MG tablet Take 2 mg by mouth      HYDROcodone-acetaminophen (NORCO) 5-325 mg per tablet TAKE 1 TABLET EVERY 6 HOURS AS NEEDED FOR SEVERE PAIN (SCALE 7-10)      Progesterone 100 MG CAPS Take 100 mg by mouth      spironolactone (ALDACTONE) 50 mg tablet Take 50 mg by mouth       No current facility-administered medications for this visit.        Allergies   Allergen Reactions    Other Other (See Comments)     NA       Review of Systems    Video Exam    There were no vitals filed for this visit.    Physical Exam     10/29/24  Start Time: 1002  Stop Time: 1044  Total Visit Time: 42 minutes

## 2024-11-06 ENCOUNTER — TELEMEDICINE (OUTPATIENT)
Dept: BEHAVIORAL/MENTAL HEALTH CLINIC | Facility: CLINIC | Age: 19
End: 2024-11-06
Payer: COMMERCIAL

## 2024-11-06 DIAGNOSIS — F41.9 ANXIETY: Primary | ICD-10-CM

## 2024-11-06 DIAGNOSIS — F32.A DEPRESSION, UNSPECIFIED DEPRESSION TYPE: ICD-10-CM

## 2024-11-06 PROCEDURE — 90834 PSYTX W PT 45 MINUTES: CPT | Performed by: COUNSELOR

## 2024-11-12 ENCOUNTER — SOCIAL WORK (OUTPATIENT)
Dept: BEHAVIORAL/MENTAL HEALTH CLINIC | Facility: CLINIC | Age: 19
End: 2024-11-12
Payer: COMMERCIAL

## 2024-11-12 DIAGNOSIS — F41.9 ANXIETY: Primary | ICD-10-CM

## 2024-11-12 PROCEDURE — 90834 PSYTX W PT 45 MINUTES: CPT | Performed by: COUNSELOR

## 2024-11-18 NOTE — PSYCH
"Behavioral Health Psychotherapy Progress Note    Psychotherapy Provided: Individual Psychotherapy     1. Anxiety            Goals addressed in session: Goal 1     DATA: Therapist shared with client that she is pregnant, and discussed the plan for while therapist is on maternity leave. Client shared that she had recently had a dinner with her family where politics were discussed. Client shared that while her father admitted to voting for Trump, that had she realized the negative impact that that decision could have on client as a trans woman, that he would have voted differently. While client expressed disappointment that her father voted for Trump, she was encouraged by his openness to learning about her experience as a trans woman and how he can better support her.    During this session, this clinician used the following therapeutic modalities: Client-centered Therapy, Cognitive Behavioral Therapy, Gender Affirmation Therapy, Solution-Focused Therapy, and Supportive Psychotherapy    Substance Abuse was not addressed during this session. If the client is diagnosed with a co-occurring substance use disorder, please indicate any changes in the frequency or amount of use: NA. Stage of change for addressing substance use diagnoses: No substance use/Not applicable    ASSESSMENT:  Eladia Colunga presents with a Euthymic/ normal mood.     her affect is Normal range and intensity, which is congruent, with her mood and the content of the session. The client has made progress on their goals.     Eladia Colunga presents with a none risk of suicide, none risk of self-harm, and none risk of harm to others.    For any risk assessment that surpasses a \"low\" rating, a safety plan must be developed.    A safety plan was indicated: no  If yes, describe in detail NA    PLAN: Between sessions, Eladia Colunga will use her coping skills. At the next session, the therapist will use Gender Affirmation Therapy to address gender " dysphoria.    Behavioral Health Treatment Plan and Discharge Planning: Eladia Colunga is aware of and agrees to continue to work on their treatment plan. They have identified and are working toward their discharge goals. yes    Visit start and stop times:    11/12/24  Start Time: 1000  Stop Time: 1050  Total Visit Time: 50 minutes

## 2024-11-20 ENCOUNTER — TELEMEDICINE (OUTPATIENT)
Dept: BEHAVIORAL/MENTAL HEALTH CLINIC | Facility: CLINIC | Age: 19
End: 2024-11-20
Payer: COMMERCIAL

## 2024-11-20 DIAGNOSIS — F64.9 GENDER DYSPHORIA: ICD-10-CM

## 2024-11-20 DIAGNOSIS — F32.A DEPRESSION, UNSPECIFIED DEPRESSION TYPE: ICD-10-CM

## 2024-11-20 DIAGNOSIS — F41.9 ANXIETY: Primary | ICD-10-CM

## 2024-11-20 PROCEDURE — 90834 PSYTX W PT 45 MINUTES: CPT | Performed by: COUNSELOR

## 2024-12-02 NOTE — PSYCH
"Behavioral Health Psychotherapy Progress Note    Psychotherapy Provided: Individual Psychotherapy     1. Anxiety        2. Gender dysphoria        3. Depression, unspecified depression type            Goals addressed in session: Goal 1 and Goal 3      DATA: Therapist and client continued to discuss and process client's sense of gender dysphoria and the impact that it has on client's mental health. Therapist and client brainstormed ways that client can better manage her mental health and physical health while experiencing gender dysphoria.    During this session, this clinician used the following therapeutic modalities: Client-centered Therapy, Cognitive Behavioral Therapy, Gender Affirmation Therapy, Solution-Focused Therapy, and Supportive Psychotherapy    Substance Abuse was not addressed during this session. If the client is diagnosed with a co-occurring substance use disorder, please indicate any changes in the frequency or amount of use: NA. Stage of change for addressing substance use diagnoses: No substance use/Not applicable    ASSESSMENT:  Eladia Colunga presents with a Euthymic/ normal mood.     her affect is Normal range and intensity, which is congruent, with her mood and the content of the session. The client has made progress on their goals.     Eladia Colunga presents with a none risk of suicide, none risk of self-harm, and none risk of harm to others.    For any risk assessment that surpasses a \"low\" rating, a safety plan must be developed.    A safety plan was indicated: no  If yes, describe in detail NA    PLAN: Between sessions, Eladia Colunga will use her coping skills. At the next session, the therapist will use Client-centered Therapy and Cognitive Behavioral Therapy to address anxiety.    Behavioral Health Treatment Plan and Discharge Planning: Eladia Colunga is aware of and agrees to continue to work on their treatment plan. They have identified and are working toward their discharge goals. yes    Visit " start and stop times:    11/20/24  Start Time: 1400  Stop Time: 1445  Total Visit Time: 45 minutes  Virtual Regular Visit    Verification of patient location:    Patient is located at Home in the following state in which I hold an active license [place]      Assessment/Plan:    Problem List Items Addressed This Visit       Anxiety - Primary    Depression    Gender dysphoria       Goals addressed in session: Goal 1 and Goal 3           Reason for visit is No chief complaint on file.       Encounter provider JOVI Love      Recent Visits  No visits were found meeting these conditions.  Showing recent visits within past 7 days and meeting all other requirements  Future Appointments  No visits were found meeting these conditions.  Showing future appointments within next 150 days and meeting all other requirements       The patient was identified by name and date of birth. Tommy Colunga was informed that this is a telemedicine visit and that the visit is being conducted throughthe Epic Embedded platform. She agrees to proceed..  My office door was closed. No one else was in the room.  She acknowledged consent and understanding of privacy and security of the video platform. The patient has agreed to participate and understands they can discontinue the visit at any time.    Patient is aware this is a billable service.     Subjective  Tommy Colunga is a 18 y.o. adult  .      HPI     No past medical history on file.    No past surgical history on file.    Current Outpatient Medications   Medication Sig Dispense Refill    escitalopram (LEXAPRO) 20 mg tablet Take 1 tablet (20 mg total) by mouth daily 90 tablet 0    estradiol (ESTRACE) 2 MG tablet Take 2 mg by mouth      HYDROcodone-acetaminophen (NORCO) 5-325 mg per tablet TAKE 1 TABLET EVERY 6 HOURS AS NEEDED FOR SEVERE PAIN (SCALE 7-10)      Progesterone 100 MG CAPS Take 100 mg by mouth      spironolactone (ALDACTONE) 50 mg tablet Take 50 mg by mouth       No current  facility-administered medications for this visit.        Allergies   Allergen Reactions    Other Other (See Comments)     NA       Review of Systems    Video Exam    There were no vitals filed for this visit.    Physical Exam     Visit Time    11/20/24  Start Time: 1400  Stop Time: 1445  Total Visit Time: 45 minutes

## 2024-12-04 ENCOUNTER — SOCIAL WORK (OUTPATIENT)
Dept: BEHAVIORAL/MENTAL HEALTH CLINIC | Facility: CLINIC | Age: 19
End: 2024-12-04
Payer: COMMERCIAL

## 2024-12-04 DIAGNOSIS — F41.9 ANXIETY: Primary | ICD-10-CM

## 2024-12-04 DIAGNOSIS — F64.9 GENDER DYSPHORIA: ICD-10-CM

## 2024-12-04 PROCEDURE — 90834 PSYTX W PT 45 MINUTES: CPT | Performed by: COUNSELOR

## 2024-12-09 NOTE — PSYCH
"Behavioral Health Psychotherapy Progress Note    Psychotherapy Provided: Individual Psychotherapy     1. Anxiety        2. Gender dysphoria            Goals addressed in session: Goal 1     DATA: Therapist and client discussed client's upcoming return to college, and client expressed her concern that she might not be ready. Therapist and client also continued to discuss client's gender dysphoria, including changes client can make to help lessen the chance of being misgendered, such as dressing more traditionally feminine, or wearing a name tag with pronouns included, or correcting more familiar individuals when they misgender her.     During this session, this clinician used the following therapeutic modalities: Client-centered Therapy, Cognitive Behavioral Therapy, Gender Affirmation Therapy, Solution-Focused Therapy, and Supportive Psychotherapy    Substance Abuse was not addressed during this session. If the client is diagnosed with a co-occurring substance use disorder, please indicate any changes in the frequency or amount of use: NA. Stage of change for addressing substance use diagnoses: No substance use/Not applicable    ASSESSMENT:  Eladia Colunga presents with a Anxious and Depressed mood.     her affect is Normal range and intensity, which is congruent, with her mood and the content of the session. The client has made progress on their goals.     Eladia Colunga presents with a none risk of suicide, none risk of self-harm, and none risk of harm to others.    For any risk assessment that surpasses a \"low\" rating, a safety plan must be developed.    A safety plan was indicated: no  If yes, describe in detail NA    PLAN: Between sessions, Eladia Colunga will use her coping skills. At the next session, the therapist will use Client-centered Therapy and Cognitive Behavioral Therapy to address anxiety.    Behavioral Health Treatment Plan and Discharge Planning: Eladia Colunga is aware of and agrees to continue to work on " their treatment plan. They have identified and are working toward their discharge goals. yes    Visit start and stop times:    12/08/24  Start Time: 1402  Stop Time: 1451  Total Visit Time: 49 minutes

## 2024-12-10 ENCOUNTER — SOCIAL WORK (OUTPATIENT)
Dept: BEHAVIORAL/MENTAL HEALTH CLINIC | Facility: CLINIC | Age: 19
End: 2024-12-10
Payer: COMMERCIAL

## 2024-12-10 DIAGNOSIS — F64.9 GENDER DYSPHORIA: ICD-10-CM

## 2024-12-10 DIAGNOSIS — F41.9 ANXIETY: Primary | ICD-10-CM

## 2024-12-10 PROCEDURE — 90834 PSYTX W PT 45 MINUTES: CPT | Performed by: COUNSELOR

## 2024-12-16 NOTE — PSYCH
Behavioral Health Psychotherapy Progress Note    Psychotherapy Provided: Individual Psychotherapy     1. Anxiety        2. Gender dysphoria            Goals addressed in session: Goal 1 and Goal 3      DATA: Client shared that since dressing in a more traditionally feminine fashion, she has been correctly gendered more often which has lessened her sense of depression. Therapist and client discussed client's anxiety about her return to college in January, as well as that therapist will be going on maternity leave in February. Therapist and client discussed the differences in client's life situation since the last time she was at college, which include: beginning psychiatric medication, coming out to her parents, being consistently on hormone treatment, having more therapy under her belt. However, therapist did challenge client's continued to struggle to have a healthy sleep/awake schedule due to client staying up very late. Therapist reminded client of the importance of practicing healthier choices prior to returning to college. Therapist also challenged the frequency with which client was getting high while at college and since being home, and recommended that client practice not engaging in substance use as often prior to returning to college.    During this session, this clinician used the following therapeutic modalities: Client-centered Therapy, Cognitive Behavioral Therapy, Gender Affirmation Therapy, Solution-Focused Therapy, and Supportive Psychotherapy    Substance Abuse was not addressed during this session. If the client is diagnosed with a co-occurring substance use disorder, please indicate any changes in the frequency or amount of use: NA. Stage of change for addressing substance use diagnoses: No substance use/Not applicable    ASSESSMENT:  Eladia Colunga presents with a Euthymic/ normal mood.     her affect is Normal range and intensity, which is congruent, with her mood and the content of the session.  "The client has made progress on their goals.     Eladia Colunga presents with a none risk of suicide, none risk of self-harm, and none risk of harm to others.    For any risk assessment that surpasses a \"low\" rating, a safety plan must be developed.    A safety plan was indicated: no  If yes, describe in detail NA    PLAN: Between sessions, Eladia Colunga will use her coping skills. At the next session, the therapist will use Client-centered Therapy and Cognitive Behavioral Therapy to address decision making.    Behavioral Health Treatment Plan and Discharge Planning: Eladia Colunga is aware of and agrees to continue to work on their treatment plan. They have identified and are working toward their discharge goals. yes    Depression Follow-up Plan Completed: No    Visit start and stop times:    12/10/24  Start Time: 1000  Stop Time: 1048  Total Visit Time: 48 minutes  "

## 2024-12-18 ENCOUNTER — SOCIAL WORK (OUTPATIENT)
Dept: BEHAVIORAL/MENTAL HEALTH CLINIC | Facility: CLINIC | Age: 19
End: 2024-12-18
Payer: COMMERCIAL

## 2024-12-18 DIAGNOSIS — F32.A DEPRESSION, UNSPECIFIED DEPRESSION TYPE: ICD-10-CM

## 2024-12-18 DIAGNOSIS — F41.9 ANXIETY: Primary | ICD-10-CM

## 2024-12-18 PROCEDURE — 90834 PSYTX W PT 45 MINUTES: CPT | Performed by: COUNSELOR

## 2024-12-24 NOTE — PSYCH
"Behavioral Health Psychotherapy Progress Note    Psychotherapy Provided: Individual Psychotherapy     1. Anxiety        2. Depression, unspecified depression type            Goals addressed in session: Goal 1     DATA: Therapist and client discussed how client is scheduled to return to college at the end of January for the Spring semester. However, therapist pointed out that client continues to not make choices that are we have previously discussed on many occasions that are in client's best interest but are not client's preferred choice, such as not staying up until the early hours of the morning due to wanting to spend time doing fun things, which then causes her to be exhausted the next morning. Therapist encouraged client to consider all of her choices when it comes to returning to college if she is unsure if she is prepared to make non-preferred but healthier/positive decisions. Client stated that she does not want to not return to college in January.    During this session, this clinician used the following therapeutic modalities: Client-centered Therapy, Cognitive Behavioral Therapy, Solution-Focused Therapy, and Supportive Psychotherapy    Substance Abuse was not addressed during this session. If the client is diagnosed with a co-occurring substance use disorder, please indicate any changes in the frequency or amount of use: NA. Stage of change for addressing substance use diagnoses: No substance use/Not applicable    ASSESSMENT:  Eladia Colunga presents with a Euthymic/ normal mood.     her affect is Normal range and intensity, which is congruent, with her mood and the content of the session. The client has not made progress on their goals.     Eladia Colunga presents with a none risk of suicide, none risk of self-harm, and none risk of harm to others.    For any risk assessment that surpasses a \"low\" rating, a safety plan must be developed.    A safety plan was indicated: no  If yes, describe in detail " NA    PLAN: Between sessions, Eladia Colunga will use her coping skills. At the next session, the therapist will use Client-centered Therapy, Cognitive Behavioral Therapy, Solution-Focused Therapy, and Supportive Psychotherapy to address decision making.    Behavioral Health Treatment Plan and Discharge Planning: Eladia Colunga is aware of and agrees to continue to work on their treatment plan. They have identified and are working toward their discharge goals. yes    Depression Follow-up Plan Completed: No    Visit start and stop times:    12/18/24  Start Time: 1400  Stop Time: 1446  Total Visit Time: 46 minutes

## 2025-01-07 ENCOUNTER — SOCIAL WORK (OUTPATIENT)
Dept: BEHAVIORAL/MENTAL HEALTH CLINIC | Facility: CLINIC | Age: 20
End: 2025-01-07
Payer: COMMERCIAL

## 2025-01-07 DIAGNOSIS — F43.23 ADJUSTMENT DISORDER WITH MIXED ANXIETY AND DEPRESSED MOOD: Primary | ICD-10-CM

## 2025-01-07 PROCEDURE — 90834 PSYTX W PT 45 MINUTES: CPT | Performed by: COUNSELOR

## 2025-01-13 NOTE — PSYCH
Behavioral Health Psychotherapy Progress Note    Psychotherapy Provided: Individual Psychotherapy     1. Adjustment disorder with mixed anxiety and depressed mood            Goals addressed in session: Goal 3      DATA: Client shared that it was difficult for her to realize that her girlfriend had spent more on her for her birthday than her parents did. Therapist and client discussed the difficulty that comes with getting older and the changes that brings with the holidays and birthdays, such as choosing one's own gifts, less parties, and less overall excitement. Client brought up that it had bothered her that therapist had stated that she feels that part of client choosing to engage in preferred activities over necessary activities (such as spending time with friends or getting high, rather than completing schoolwork or getting a good night's sleep) is lack of maturity. Therapist explained what she meant by that, but explained that part of maturity is sometimes choosing to do non-preferred activities when it is in one's best interest. Client stated that she engages with her friends instead because she knows she won't be able to complete her schoolwork, and therapist pointed out that while discussing this decision beforehand with therapist, that client would often smirk and say that she would complete the schoolwork in the morning prior to class, when she knew that was not going to occur.    During this session, this clinician used the following therapeutic modalities: Client-centered Therapy, Cognitive Behavioral Therapy, Mindfulness-based Strategies, Solution-Focused Therapy, and Supportive Psychotherapy    Substance Abuse was not addressed during this session. If the client is diagnosed with a co-occurring substance use disorder, please indicate any changes in the frequency or amount of use: NA. Stage of change for addressing substance use diagnoses: No substance use/Not applicable    ASSESSMENT:  Eladia Colunga  "presents with a Euthymic/ normal and Anxious mood.     her affect is Normal range and intensity, which is congruent, with her mood and the content of the session. The client has made progress on their goals.     Eladia Colunga presents with a none risk of suicide, none risk of self-harm, and none risk of harm to others.    For any risk assessment that surpasses a \"low\" rating, a safety plan must be developed.    A safety plan was indicated: no  If yes, describe in detail NA    PLAN: Between sessions, Eladia Colunga will use her coping skills. At the next session, the therapist will use Client-centered Therapy, Cognitive Behavioral Therapy, Solution-Focused Therapy, and Supportive Psychotherapy to address decision making.    Behavioral Health Treatment Plan and Discharge Planning: Eladia Colunga is aware of and agrees to continue to work on their treatment plan. They have identified and are working toward their discharge goals. yes    Depression Follow-up Plan Completed: No    Visit start and stop times:    01/07/25  Start Time: 1004  Stop Time: 1056  Total Visit Time: 52 minutes  "

## 2025-01-14 NOTE — PSYCH
Psychiatric Medication Management - Behavioral Health   Tommy Colunga 19 y.o. adult MRN: [id number]    This note was not shared with the patient due to reasonable likelihood of causing patient harm       Administrative Statements     Encounter provider MERCEDES Miramontes    The Patient is located at Home and in the following state in which I hold an active license [place].    The patient was identified by name and date of birth. Tommy Colunga was informed that this is a telemedicine visit and that the visit is being conducted through the Epic Embedded platform. She agrees to proceed..  My office door was closed. No one else was in the room.  She acknowledged consent and understanding of privacy and security of the video platform. The patient has agreed to participate and understands they can discontinue the visit at any time.    I have spent a total time of 13 minutes in caring for this patient on the day of the visit/encounter including Risks and benefits of tx options, Instructions for management, Patient and family education, Importance of tx compliance, Counseling / Coordination of care, Documenting in the medical record, Reviewing / ordering tests, medicine, procedures  , Obtaining or reviewing history  , and Communicating with other healthcare professionals .     Reason for Visit: Follow-up for medication management    Date of visit: 1/24/2025    Assessment & Plan  Anxiety    Orders:    escitalopram (LEXAPRO) 20 mg tablet; Take 1 tablet (20 mg total) by mouth daily    Depression, unspecified depression type    Orders:    escitalopram (LEXAPRO) 20 mg tablet; Take 1 tablet (20 mg total) by mouth daily         Assessment/Plan:     Impression:  Depression  Anxiety     Continue Lexapro 20 mg daily for anxiety and depression  [facility] prescribes Vyvanse 20 mg daily for ADHD  Recommend to continue outpatient therapy at [facility]  Recommended psychological testing for ADHD diagnoses- Advised to contact  "insurance company   Medical follow up with PCP as needed  Aware of 24 hour and weekend coverage for urgent situations accessed by calling [facility] main practice number  Follow up in 3 months     Treatment Plan: Next treatment plan due 2/23/2025.     Treatment Recommendations:      Risks, Benefits And Possible Side Effects Of Medications:  Risks, benefits, and possible side effects of medications explained to patient and family, they verbalize understanding and Reviewed risks/benefits and side effects of antidepressant medications including black box warning on antidepressants, patient and family verbalize understanding.    Controlled Medication Discussion: No records found for controlled prescriptions according to [place] Prescription Drug Monitoring Program.        Subjective:  Medication compliance: yes  Medication side effects: Denies    HPI   Eladia is a 19 y.o. adult  (prefers pronouns She/Her) with a history of Adjustment Disorder, with mixed anxiety and depressed mood, who presents for follow-up for medication management.  Currently being observed on Lexapro 20 mg daily.  Eladia is currently connected to outpatient therapy with Dorene Monge at [facility]. No additional services at this time.     Eladia reports medication compliance and denies any side effects at this time.  She reports she went to [facility] for ADHD testing and was prescribed Vyvanse 20 mg daily and diagnosed with ADHD.  Eladia reports it is working well and she is able to do things with less of a mental lantigua with herself.  She also feels more motivated and more focused.  She does not zone out as much as she previously had.  She states the ADHD testing was just a virtual visit with a bunch of questions. Eladia reports her mood is \"okay\".  Sleep and appetite remain adequate, however she notices with the Vyvanse she needs to eat before she takes the medication.  She reports her energy " levels have somewhat improved and her motivation is improved. Eladia adamantly denies any suicidal thoughts, plan, or intent.  She denies HI, AH, or VH.  She does not endorse any irritability or mood swings.  She denies any periods of elevated mood or isac or any frequent crying spells.  She does not endorse any depression today and states she is just mildly anxious about starting college back up tomorrow at [employer].  No medication changes at this time.        Review Of Systems:     Constitutional Negative   ENT Negative   Cardiovascular Negative   Respiratory Negative   Gastrointestinal Negative   Genitourinary Negative   Musculoskeletal Negative   Integumentary Negative   Neurological Negative   Endocrine Negative     Past Medical History:   Patient Active Problem List   Diagnosis    Adjustment disorder with mixed anxiety and depressed mood    Displaced fracture of shaft of left clavicle, initial encounter for closed fracture    History of varicella as a child    Melanocytic nevi, unspecified    Retinal telangiectasis, right eye    Presence of spectacles and contact lenses    Anxiety    Depression    Gender dysphoria    Pain in unspecified toe(s)    Personal history of other infectious and parasitic diseases    Surgical absence of teeth    Varicella without complication    Paronychia of finger    Acne vulgaris    Atopic dermatitis, unspecified    Other specified disorders of bone, shoulder    Other specified health status    Other fatigue    Unspecified abdominal pain   Seizure history- Denies     Allergies:   Allergies   Allergen Reactions    Other Other (See Comments)     NA       Past Surgical History:   Collar bone - plates and screws   Abbott teeth extraction    Past Psychiatric History:   Past Inpatient Psychiatric Treatment:   No history of past inpatient psychiatric admissions  Past Outpatient Psychiatric Treatment:    In therapy with Dorene Granados  Past Suicide Attempts:  "no  Past Violent Behavior: no  Past Psychiatric Medication Trials: None    Family Psychiatric History:   Sister- depression/anxiety  Paternal Grandmother- anxiety     Social History:   Lives with parents and sister  Single   Kids-o  Occupation- PT at [employer]   Hobbies- Video games, reading, rock climbing, long board- how he broke collar bone                                 Still enjoy (Anhedonia)-Depends- can't focus  [employer] [facility] [employer]    Substance Abuse History:   Occasional Marijuana- Edibles- last used month and a half ago     Traumatic History:   Abuse: Denies  Other Traumatic Events: Rather not talk about it    The following portions of the patient's history were reviewed and updated as appropriate: allergies, current medications, past family history, past medical history, past social history, past surgical history, and problem list.    Objective:  There were no vitals filed for this visit.      Weight (last 2 days)       None            Labs:   No results found for this or any previous visit.    Mental status:  Appearance sitting comfortably in chair, dressed in casual clothing, adequate hygiene and grooming, cooperative with interview, good eye contact   Mood \"Okay\"   Affect Appears generally euthymic, stable, mood-congruent   Speech Normal rate, rhythm, and volume   Thought Processes Linear and goal directed   Associations intact associations   Hallucinations Denies any auditory or visual hallucinations   Thought Content No passive or active suicidal or homicidal ideation, intent, or plan.   Orientation Oriented to person, place, time, and situation   Recent and Remote Memory Grossly intact   Attention Span and Concentration Concentration intact   Intellect Appears to be of Average Intelligence   Ins[employer] Insight intact   Judgement judgment was intact   Muscle Strength Not formally assessed due to virtual visit   Language Within normal limits   Fund of Knowledge Age appropriate "   Pain None     PHQ-A Depression Screening                     Visit Time    Visit Start Time: 10:57 AM  Visit Stop Time: 11;10 AM  Total Visit Duration:  13 minutes    This note may have been written with the assistance of dictation software. Please excuse any grammatical  errors, misspellings,  and abnormal spacing of letters, sentences or paragraphs .     MERCEDES Miramontes  01/24/25

## 2025-01-15 ENCOUNTER — SOCIAL WORK (OUTPATIENT)
Dept: BEHAVIORAL/MENTAL HEALTH CLINIC | Facility: CLINIC | Age: 20
End: 2025-01-15
Payer: COMMERCIAL

## 2025-01-15 DIAGNOSIS — F41.9 ANXIETY: Primary | ICD-10-CM

## 2025-01-15 PROCEDURE — 90834 PSYTX W PT 45 MINUTES: CPT | Performed by: COUNSELOR

## 2025-01-20 NOTE — PSYCH
"Behavioral Health Psychotherapy Progress Note    Psychotherapy Provided: Individual Psychotherapy     1. Anxiety            Goals addressed in session: Goal 1 and Goal 2     DATA: Therapist and client discussed client's upcoming return to college. Client has been working on figuring out her housing situation once she returns to college which therapist verbally reinforced. Client shared that recently she had been assessed by another practice ([facility]) for ADHD, and had been prescribed Vyvanse, and stated that her parents were being asked to complete [place] assessments. Client expressed that the Vyvanse began working on the first day she took it.    During this session, this clinician used the following therapeutic modalities: Client-centered Therapy, Cognitive Behavioral Therapy, Solution-Focused Therapy, and Supportive Psychotherapy    Substance Abuse was not addressed during this session. If the client is diagnosed with a co-occurring substance use disorder, please indicate any changes in the frequency or amount of use: NA. Stage of change for addressing substance use diagnoses: No substance use/Not applicable    ASSESSMENT:  Eladia Colunga presents with a Euthymic/ normal mood.     her affect is Normal range and intensity, which is congruent, with her mood and the content of the session. The client has made progress on their goals.     Eladia Colunga presents with a none risk of suicide, none risk of self-harm, and none risk of harm to others.    For any risk assessment that surpasses a \"low\" rating, a safety plan must be developed.    A safety plan was indicated: no  If yes, describe in detail NA    PLAN: Between sessions, Eladia Colunga will use her coping skills. At the next session, the therapist will use Client-centered Therapy, Cognitive Behavioral Therapy, Solution-Focused Therapy, and Supportive Psychotherapy to address prep for college.    Behavioral Health Treatment Plan and Discharge Planning: " Eladia Keanu is aware of and agrees to continue to work on their treatment plan. They have identified and are working toward their discharge goals. yes    Depression Follow-up Plan Completed: No    Visit start and stop times:    01/15/25  Start Time: 1400  Stop Time: 1451  Total Visit Time: 51 minutes

## 2025-01-21 ENCOUNTER — SOCIAL WORK (OUTPATIENT)
Dept: BEHAVIORAL/MENTAL HEALTH CLINIC | Facility: CLINIC | Age: 20
End: 2025-01-21
Payer: COMMERCIAL

## 2025-01-21 DIAGNOSIS — F41.9 ANXIETY: Primary | ICD-10-CM

## 2025-01-21 PROCEDURE — 90834 PSYTX W PT 45 MINUTES: CPT | Performed by: COUNSELOR

## 2025-01-24 ENCOUNTER — TELEPHONE (OUTPATIENT)
Dept: PSYCHIATRY | Facility: CLINIC | Age: 20
End: 2025-01-24

## 2025-01-24 ENCOUNTER — TELEMEDICINE (OUTPATIENT)
Dept: PSYCHIATRY | Facility: CLINIC | Age: 20
End: 2025-01-24

## 2025-01-24 DIAGNOSIS — F32.A DEPRESSION, UNSPECIFIED DEPRESSION TYPE: ICD-10-CM

## 2025-01-24 DIAGNOSIS — F41.9 ANXIETY: Primary | ICD-10-CM

## 2025-01-24 PROBLEM — R10.9 UNSPECIFIED ABDOMINAL PAIN: Status: ACTIVE | Noted: 2025-01-24

## 2025-01-24 PROBLEM — R53.83 OTHER FATIGUE: Status: ACTIVE | Noted: 2025-01-24

## 2025-01-24 RX ORDER — LISDEXAMFETAMINE DIMESYLATE 20 MG/1
1 CAPSULE ORAL DAILY
COMMUNITY
Start: 2025-01-10

## 2025-01-24 RX ORDER — ESCITALOPRAM OXALATE 20 MG/1
20 TABLET ORAL DAILY
Qty: 90 TABLET | Refills: 1 | Status: SHIPPED | OUTPATIENT
Start: 2025-01-24

## 2025-01-24 NOTE — TELEPHONE ENCOUNTER
Patient contacted the office to schedule a follow up visit with provider. Patient is now scheduled for 4/25/2025  at 11 am virtually.

## 2025-01-24 NOTE — TELEPHONE ENCOUNTER
Writer called to schedule 3 month follow up with Daylin Humphries. Writer also shared forms that will be  prior to follow up.    Client can also schedule/sign forms in person as she will be here for therapy.

## 2025-01-27 NOTE — PSYCH
"Behavioral Health Psychotherapy Progress Note    Psychotherapy Provided: Individual Psychotherapy     1. Anxiety            Goals addressed in session: Goal 1     DATA: Therapist and client continued to discuss and process client's upcoming return to college after having taken a semester off to work on her mental health. Therapist and client discussed techniques client can use while at college to improve upon her ability to succeed. Therapist and client worked on challenging and reframing client's anxious thoughts.    During this session, this clinician used the following therapeutic modalities: Client-centered Therapy, Cognitive Behavioral Therapy, Mindfulness-based Strategies, Solution-Focused Therapy, and Supportive Psychotherapy    Substance Abuse was not addressed during this session. If the client is diagnosed with a co-occurring substance use disorder, please indicate any changes in the frequency or amount of use: NA. Stage of change for addressing substance use diagnoses: No substance use/Not applicable    ASSESSMENT:  Eladia Colunga presents with a Euthymic/ normal mood.     her affect is Normal range and intensity, which is congruent, with her mood and the content of the session. The client has made progress on their goals.     Eladia Colunga presents with a none risk of suicide, none risk of self-harm, and none risk of harm to others.    For any risk assessment that surpasses a \"low\" rating, a safety plan must be developed.    A safety plan was indicated: no  If yes, describe in detail NA    PLAN: Between sessions, Eladia Colunga will use her coping skills. At the next session, the therapist will use Cognitive Behavioral Therapy to address anxiety.    Behavioral Health Treatment Plan and Discharge Planning: Eladia Colunga is aware of and agrees to continue to work on their treatment plan. They have identified and are working toward their discharge goals. yes    Depression Follow-up Plan Completed: No    Visit start " and stop times:    01/21/25  Start Time: 1001  Stop Time: 1050  Total Visit Time: 49 minutes

## 2025-01-29 ENCOUNTER — TELEMEDICINE (OUTPATIENT)
Dept: BEHAVIORAL/MENTAL HEALTH CLINIC | Facility: CLINIC | Age: 20
End: 2025-01-29
Payer: COMMERCIAL

## 2025-01-29 DIAGNOSIS — F41.9 ANXIETY: Primary | ICD-10-CM

## 2025-01-29 PROCEDURE — 90834 PSYTX W PT 45 MINUTES: CPT | Performed by: COUNSELOR

## 2025-02-03 NOTE — PSYCH
"Behavioral Health Psychotherapy Progress Note    Psychotherapy Provided: Individual Psychotherapy     1. Anxiety            Goals addressed in session: Goal 1 and Goal 2     DATA: Client shared that since returning to college that she has been on completing her schoolwork on time. Client shared that when she felt herself getting distracted, that she took a break to go take a walk, and then came back and was able to focus again on her schoolwork. Client reports being currently caught up on her assignments, which therapist verbally reinforced. Therapist and client discussed client's desire to spend time with Iglesia again, but how her girlfriend has expressed that she is not comfortable with client spending on-on-one time with Iglesia. Therapist and client explored what information might be helpful to obtain during her upcoming conversation with her girlfriend on the topic.    During this session, this clinician used the following therapeutic modalities: Client-centered Therapy, Cognitive Behavioral Therapy, Solution-Focused Therapy, and Supportive Psychotherapy    Substance Abuse was not addressed during this session. If the client is diagnosed with a co-occurring substance use disorder, please indicate any changes in the frequency or amount of use: NA. Stage of change for addressing substance use diagnoses: No substance use/Not applicable    ASSESSMENT:  Eladia Colunga presents with a Euthymic/ normal mood.     her affect is Normal range and intensity, which is congruent, with her mood and the content of the session. The client has made progress on their goals.     Eladia Colunga presents with a none risk of suicide, none risk of self-harm, and none risk of harm to others.    For any risk assessment that surpasses a \"low\" rating, a safety plan must be developed.    A safety plan was indicated: no  If yes, describe in detail NA    PLAN: Between sessions, Eladia Colunga will use her coping skills. At the next session, the " therapist will use Client-centered Therapy and Cognitive Behavioral Therapy to address anxiety.    Behavioral Health Treatment Plan and Discharge Planning: Eladia Colunga is aware of and agrees to continue to work on their treatment plan. They have identified and are working toward their discharge goals. yes    Depression Follow-up Plan Completed: No    Visit start and stop times:    01/29/25  Start Time: 1400  Stop Time: 1450  Total Visit Time: 50 minutes  Virtual Regular Visit    Verification of patient location:    Patient is located at Home in the following state in which I hold an active license [place]      Assessment/Plan:    Problem List Items Addressed This Visit       Anxiety - Primary       Goals addressed in session: Goal 1 and Goal 2     Depression Follow-up Plan Completed: No    Reason for visit is No chief complaint on file.       Encounter provider JOVI Love      Recent Visits  Date Type Provider Dept   01/29/25 Telemedicine JOVI Love [facility] Therapist [facility]   Showing recent visits within past 7 days and meeting all other requirements  Future Appointments  No visits were found meeting these conditions.  Showing future appointments within next 150 days and meeting all other requirements       The patient was identified by name and date of birth. Tommy Colunga was informed that this is a telemedicine visit and that the visit is being conducted throughthe Epic Embedded platform. She agrees to proceed..  My office door was closed. No one else was in the room.  She acknowledged consent and understanding of privacy and security of the video platform. The patient has agreed to participate and understands they can discontinue the visit at any time.    Patient is aware this is a billable service.     Subjective  Tommy Colunga is a 19 y.o. adult  .      HPI     No past medical history on file.    No past surgical history on file.    Current Outpatient Medications   Medication Sig Dispense Refill     escitalopram (LEXAPRO) 20 mg tablet Take 1 tablet (20 mg total) by mouth daily 90 tablet 1    estradiol (ESTRACE) 2 MG tablet Take 2 mg by mouth      HYDROcodone-acetaminophen (NORCO) 5-325 mg per tablet TAKE 1 TABLET EVERY 6 HOURS AS NEEDED FOR SEVERE PAIN (SCALE 7-10)      lisdexamfetamine (VYVANSE) 20 MG capsule Take 1 capsule by mouth in the morning      Progesterone 100 MG CAPS Take 100 mg by mouth      spironolactone (ALDACTONE) 50 mg tablet Take 50 mg by mouth       No current facility-administered medications for this visit.        Allergies   Allergen Reactions    Other Other (See Comments)     NA       Review of Systems    Video Exam    There were no vitals filed for this visit.    Physical Exam     Visit Time    01/29/25  Start Time: 1400  Stop Time: 1450  Total Visit Time: 50 minutes

## 2025-02-07 ENCOUNTER — TELEMEDICINE (OUTPATIENT)
Dept: BEHAVIORAL/MENTAL HEALTH CLINIC | Facility: CLINIC | Age: 20
End: 2025-02-07
Payer: COMMERCIAL

## 2025-02-07 DIAGNOSIS — F41.9 ANXIETY: Primary | ICD-10-CM

## 2025-02-07 PROCEDURE — 90832 PSYTX W PT 30 MINUTES: CPT | Performed by: COUNSELOR

## 2025-02-10 NOTE — PSYCH
"Behavioral Health Psychotherapy Progress Note    Psychotherapy Provided: Individual Psychotherapy     1. Anxiety            Goals addressed in session: Goal 1 and Goal 2     DATA: Client shared that she has been struggling the week of this session with motivation and focus with regards to her schoolwork. Therapist and client discussed client's plan to try to make progress on her missing schoolwork and her plan for moving forward. Therapist and client discussed client's recent conversation with her girlfriend regarding boundaries within their relationship with regards to client spending 1:! time with [employer]. Client expressed that she is okay with this boundary for now, but hopes that it will change in the future.    During this session, this clinician used the following therapeutic modalities: Client-centered Therapy, Cognitive Behavioral Therapy, Solution-Focused Therapy, and Supportive Psychotherapy    Substance Abuse was not addressed during this session. If the client is diagnosed with a co-occurring substance use disorder, please indicate any changes in the frequency or amount of use: NA. Stage of change for addressing substance use diagnoses: No substance use/Not applicable    ASSESSMENT:  Eladia Colunga presents with a Euthymic/ normal mood.     her affect is Normal range and intensity, which is congruent, with her mood and the content of the session. The client has made progress on their goals.     Eladia Colunga presents with a none risk of suicide, none risk of self-harm, and none risk of harm to others.    For any risk assessment that surpasses a \"low\" rating, a safety plan must be developed.    A safety plan was indicated: no  If yes, describe in detail NA    PLAN: Between sessions, Eladia Colunga will use her coping skills. At the next session, the therapist will use Client-centered Therapy and Cognitive Behavioral Therapy to address anxiety.    Behavioral Health Treatment Plan and Discharge Planning: Eladia Colunga" is aware of and agrees to continue to work on their treatment plan. They have identified and are working toward their discharge goals. yes    Depression Follow-up Plan Completed: No    Visit start and stop times:    02/07/25  Start Time: 1213  Stop Time: 1246  Total Visit Time: 33 minutes  Virtual Regular Visit    Verification of patient location:    Patient is located at Other in the following state in which I hold an active license [place]      Assessment/Plan:    Problem List Items Addressed This Visit       Anxiety - Primary       Goals addressed in session: Goal 1 and Goal 2     Depression Follow-up Plan Completed: No    Reason for visit is No chief complaint on file.       Encounter provider JOVI Love      Recent Visits  Date Type Provider Dept   02/07/25 Telemedicine JOVI Love [facility]   Showing recent visits within past 7 days and meeting all other requirements  Future Appointments  No visits were found meeting these conditions.  Showing future appointments within next 150 days and meeting all other requirements       The patient was identified by name and date of birth. Tommy Colunga was informed that this is a telemedicine visit and that the visit is being conducted throughthe Epic Embedded platform. She agrees to proceed..  My office door was closed. No one else was in the room.  She acknowledged consent and understanding of privacy and security of the video platform. The patient has agreed to participate and understands they can discontinue the visit at any time.    Patient is aware this is a billable service.     Subjective  Tommy Colunga is a 19 y.o. adult  .      HPI     No past medical history on file.    No past surgical history on file.    Current Outpatient Medications   Medication Sig Dispense Refill    escitalopram (LEXAPRO) 20 mg tablet Take 1 tablet (20 mg total) by mouth daily 90 tablet 1    estradiol (ESTRACE) 2 MG tablet Take 2 mg by mouth       HYDROcodone-acetaminophen (NORCO) 5-325 mg per tablet TAKE 1 TABLET EVERY 6 HOURS AS NEEDED FOR SEVERE PAIN (SCALE 7-10)      lisdexamfetamine (VYVANSE) 20 MG capsule Take 1 capsule by mouth in the morning      Progesterone 100 MG CAPS Take 100 mg by mouth      spironolactone (ALDACTONE) 50 mg tablet Take 50 mg by mouth       No current facility-administered medications for this visit.        Allergies   Allergen Reactions    Other Other (See Comments)     NA       Review of Systems    Video Exam    There were no vitals filed for this visit.    Physical Exam     Visit Time    02/07/25  Start Time: 1213  Stop Time: 1246  Total Visit Time: 33 minutes

## 2025-02-12 ENCOUNTER — TELEMEDICINE (OUTPATIENT)
Dept: BEHAVIORAL/MENTAL HEALTH CLINIC | Facility: CLINIC | Age: 20
End: 2025-02-12
Payer: COMMERCIAL

## 2025-02-12 DIAGNOSIS — F43.23 ADJUSTMENT DISORDER WITH MIXED ANXIETY AND DEPRESSED MOOD: Primary | ICD-10-CM

## 2025-02-12 DIAGNOSIS — F64.9 GENDER DYSPHORIA: ICD-10-CM

## 2025-02-12 DIAGNOSIS — F41.9 ANXIETY: ICD-10-CM

## 2025-02-12 DIAGNOSIS — F32.A DEPRESSION, UNSPECIFIED DEPRESSION TYPE: ICD-10-CM

## 2025-02-12 PROCEDURE — 90834 PSYTX W PT 45 MINUTES: CPT | Performed by: COUNSELOR

## 2025-02-14 NOTE — BH TREATMENT PLAN
Outpatient Behavioral Health Psychotherapy Treatment Plan    Eladia Colunga  2005     Date of Initial Psychotherapy Assessment: 01/20/23  Date of Current Treatment Plan: 02/12/25  Treatment Plan Target Date: 08/11/25  Treatment Plan Expiration Date: 08/11/25    Diagnosis:   1. Adjustment disorder with mixed anxiety and depressed mood        2. Anxiety        3. Depression, unspecified depression type        4. Gender dysphoria              Area(s) of Need: Anxiety    Long Term Goal 1 (in the client's own words): Lessen level of anxiety    Stage of Change: action    Target Date for completion: 08/11/25     Anticipated therapeutic modalities: Supportive therapy, CBT     People identified to complete this goal: Client, therapist      Objective 1: (identify the means of measuring success in meeting the objective): Develop a list of healthy coping skills, and use them 4 out of 5 times      Objective 2: (identify the means of measuring success in meeting the objective): Discuss ways to challenge and reframe anxious thoughts in 3 out of every 5 sessions       Long Term Goal 2 (in the client's own words): Determine whether a diagnosis of ADHD is appropriate for client    Stage of Change: action    Target Date for completion: 08/11/25     Anticipated therapeutic modalities: Supportive therapy, Psycho-education     People identified to complete this goal: Client, therapist      Objective 1: (identify the means of measuring success in meeting the objective): Discuss the symptoms of ADHD in therapy and determine if these symptoms apply to client     Long Term Goal 3 (in the client's own words): Client will continue to lessen their sense of depression    Stage of Change: action    Target Date for completion: 08/11/25     Anticipated therapeutic modalities: Supportive therapy, Psycho-education, Coping skill developement     People identified to complete this goal: Client, therapist      Objective 1: (identify the means of  measuring success in meeting the objective): Discuss client's triggers and practice challenging depressive thoughts in 3 out of 5 sessions    I am currently under the care of a [facility] psychiatric provider: Yes    My [facility] psychiatric provider is: Daylin Humphries    I am currently taking psychiatric medications: Yes, as prescribed    I feel that I will be ready for discharge from mental health care when I reach the following (measurable goal/objective): Meet goals    For children and adults who have a legal guardian:   Has there been any change to custody orders and/or guardianship status? NA. If yes, attach updated documentation.    I have created my Crisis Plan and have been offered a copy of this plan    Behavioral Health Treatment Plan [facility]: Diagnosis and Treatment Plan explained to Eladia Colunga acknowledges an understanding of their diagnosis. Eladia Colunga agrees to this treatment plan.    I have been offered a copy of this Treatment Plan. yes

## 2025-04-15 NOTE — PSYCH
MEDICATION MANAGEMENT NOTE    Name: Tommy Colunga      : 2005      MRN: [id number]  Encounter Provider: MERCEDES Miramontes  Encounter Date: 2025   Encounter department: [facility] OUTPATIENT    Insurance: Payor: [employer] / Plan: [employer] / Product Type: PPO Commercial /      Reason for Visit: Follow-up for medication management:  Assessment & Plan  Adjustment disorder with mixed anxiety and depressed mood   - Continue Lexapro 20 mg daily fro anxiety and depression       Gender dysphoria   - Recommended to continue with psychotherapy          Impression:  Depression  Anxiety     Continue Lexapro 20 mg daily for anxiety and depression  [facility] prescribes Vyvanse 30 mg daily for ADHD- PDMP reviewed- last filled 2025  Recommend to continue outpatient therapy at [facility]  Recommended psychological testing for ADHD diagnoses- Advised to contact insurance company   Medical follow up with PCP as needed  Aware of 24 hour and weekend coverage for urgent situations accessed by calling [facility] Outpatient main practice number  Follow up in 3 months      Treatment Plan: Next treatment plan due 2025.     Treatment Recommendations:    Educated about diagnosis and treatment modalities. Verbalizes understanding and agreement with the treatment plan.  Discussed self monitoring of symptoms, and symptom monitoring tools.  Discussed medications and if treatment adjustment was needed or desired.  Aware of 24 hour and weekend coverage for urgent situations accessed by calling [facility] main practice number  I am scheduling this patient out for greater than 3 months: No    Medications Risks/Benefits:      Risks, Benefits And Possible Side Effects Of Medications:    Risks, benefits, and possible side effects of medications explained to Eladia and she (or legal  "representative) verbalizes understanding and agreement for treatment.    Controlled Medication Discussion:     Eladia has been filling controlled prescriptions on time as prescribed according to [place] Prescription Drug Monitoring Program.  Discussed with Eladia the risks of impairment of ability to drive and potential for abuse and addiction related to treatment with stimulant medications. She understands risk of treatment with stimulant medications, agrees to not drive if feels impaired and agrees to take medications as prescribed.  Eladia is using medication appropriately.      History of Present Illness     HPI   Eladia is a 19 y.o. adult  (prefers pronouns She/Her) with a history of Adjustment Disorder, with mixed anxiety and depressed mood, who presents for follow-up for medication management.  Currently being observed on Lexapro 20 mg daily.  Eladia is currently connected to outpatient therapy with Dorene Monge at [facility]. No additional services at this time.     Eladia reports medication compliance and denies any side effects at this time.  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  She states her Vyvanse was just increased to 30 mg and finds this beneficial. 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  She is still with her significant other of 6 months.  She has been meeting with a therapist for the school while Dorene is on maternity leave.  Reports her mood is \"good\", and states the other day she got sad and she was unsure why, however it was 11 PM.  She woke up fine the next day.  Sleep is adequate.  Energy levels and motivation are okay.  Appetite can be a struggle at times and has to remember to eat more with the Vyvanse.  She states she has an alarm on her phone to help with this. Eladia denies SI, HI, AH, or VH.  She reports mild mood swings late at night and sometimes sadness, but feels this is " manageable.  Denies any irritability or periods of elevated mood or isac.  Has periods of anxiety and depression but appears to be managing well.  No medication changes at this time.    Review Of Systems: A review of systems is obtained and is negative except for the pertinent positives listed in HPI/Subjective above.      Current Rating Scores:     None completed today.    Areas of Improvement: reviewed in HPI/Subjective Section and reviewed in Assessment and Plan Section    History reviewed. No pertinent past medical history.  History reviewed. No pertinent surgical history.  Allergies:   Allergies   Allergen Reactions    Other Other (See Comments)     NA       Current Outpatient Medications   Medication Instructions    escitalopram (LEXAPRO) 20 mg, Oral, Daily    estradiol (ESTRACE) 2 mg, Oral    HYDROcodone-acetaminophen (NORCO) 5-325 mg per tablet TAKE 1 TABLET EVERY 6 HOURS AS NEEDED FOR SEVERE PAIN (SCALE 7-10)    lisdexamfetamine (VYVANSE) 20 MG capsule 1 capsule, Daily    lisdexamfetamine (VYVANSE) 30 MG capsule TAKE 1 CAPSULE BY MOUTH EVERY DAY **ONLY 28 PILLS, REMAINDER VOID**    Progesterone 100 mg, Oral    spironolactone (ALDACTONE) 50 mg, Oral        Substance Abuse History:    Tobacco, Alcohol and Drug Use History     Tobacco Use    Smoking status: Never    Smokeless tobacco: Never   Substance Use Topics    Alcohol use: Not on file    Drug use: Not on file     Alcohol Use: Not At Risk (4/11/2024)    [facility]    AUDIT-C     Frequency of Alcohol Consumption: Never     Average Number of Drinks: Patient does not drink     Frequency of Binge Drinking: Never       Social History:    Social History     Socioeconomic History    Marital status: Single     Spouse name: Not on file    Number of children: Not on file    Years of education: Not on file    Highest education level: Not on file   Occupational History    Not on file   Other Topics Concern    Not on file   Social  "History Narrative    Not on file        Family Psychiatric History:     History reviewed. No pertinent family history.    Medical History Reviewed by provider this encounter:  Tobacco  Allergies  Meds  Problems  Med Hx  Surg Hx  Fam Hx          Objective   There were no vitals taken for this visit.     Mental Status Evaluation:    Appearance age appropriate, casually dressed   Behavior cooperative, calm   Speech normal rate, normal volume, normal pitch, spontaneous   Mood euthymic   Affect normal range and intensity, appropriate   Thought Processes organized, goal directed   Thought Content no overt delusions   Perceptual Disturbances: no auditory hallucinations, no visual hallucinations   Abnormal Thoughts  Risk Potential Suicidal ideation - None  Homicidal ideation - None  Potential for aggression - No   Orientation oriented to person, place, time/date, and situation   Memory recent and remote memory grossly intact   Consciousness alert and awake   Attention Span Concentration Span attention span and concentration are age appropriate   Intellect appears to be of average intelligence   Insight intact   Judgement intact   Muscle Strength and  Gait unable to assess today due to virtual visit   Motor activity no abnormal movements   Language no difficulty naming common objects, no difficulty repeating a phrase, no difficulty writing a sentence   Fund of Knowledge adequate knowledge of current events  adequate fund of knowledge regarding past history  adequate fund of knowledge regarding vocabulary    Pain none   Pain Scale Not formally asked to rate        Laboratory Results: I have personally reviewed all pertinent laboratory/tests results    Most Recent Labs: No results found for: \"WBC\", \"RBC\", \"HGB\", \"HCT\", \"PLT\", \"RDW\", \"NEUTROABS\", \"NA\", \"SODIUM\", \"K\", \"CL\", \"CO2\", \"BUN\", \"CREATININE\", \"GLUCOSE\", \"CALCIUM\", \"AST\", \"ALT\", \"ALKPHOS\", \"PROT\", \"TP\", \"BILITOT\", \"TBILI\", \"CHOL\", \"CHOLESTEROL\", \"TRIG\", \"HDL\", " "\"LDLCALC\", \"NONHDLC\", \"VALPROICTOT\", \"CARBAMAZEPIN\", \"LITHIUM\", \"AMMONIA\", \"DCK3EDOVFTES\", \"FREET4\", \"T3FREE\", \"PREGTESTUR\", \"PREGSERUM\", \"HCG\", \"HCGQUANT\", \"RPR\", \"TREPONEMAPA\"    Suicide/Homicide Risk Assessment:    Risk of Harm to Self:  The following ratings are based on assessment at the time of the interview  Historical Risk Factors include: history of depression, history of anxiety  Protective Factors: no current suicidal ideation, ability to adapt to change, able to make plans for the future, access to mental health treatment, compliant with medications, compliant with mental health treatment    Risk of Harm to Others:  The following ratings are based on assessment at the time of the interview  Historical Risk Factors include: none  Protective Factors: no current homicidal ideation, ability to adapt to change, able to manage anger well, access to mental health treatment, compliant with medications, compliant with mental health treatment    The following interventions are recommended: Continue medication management. No other intervention changes indicated at this time.    Psychotherapy Provided:     Individual psychotherapy provided: No    Treatment Plan:    Completed and signed during the session: Not applicable - Treatment Plan not due at this session.    Goals: Progress towards Treatment Plan goals - Yes, progressing, as evidenced by subjective findings in HPI/Subjective Section and in Assessment and Plan Section    Depression Follow-up Plan Completed: Yes    Note Share:    This note was not shared with the patient due to reasonable likelihood of causing patient harm      Administrative Statements     Encounter provider MERCEDES Miramontes    The Patient is located at Home and in the following state in which I hold an active license [place].    The patient was identified by name and date of birth. Tommy Colunga was informed that this is a telemedicine visit and that the visit is being conducted through the " Epic Embedded platform. She agrees to proceed..  My office door was closed. No one else was in the room.  She acknowledged consent and understanding of privacy and security of the video platform. The patient has agreed to participate and understands they can discontinue the visit at any time.    I have spent a total time of 25 minutes in caring for this patient on the day of the visit/encounter including Risks and benefits of tx options, Instructions for management, Patient and family education, Importance of tx compliance, Documenting in the medical record, Reviewing/placing orders in the medical record (including tests, medications, and/or procedures), and Obtaining or reviewing history  , not including the time spent for establishing the audio/video connection.    Visit Time  Visit Start Time: 10:55 AM  Visit Stop Time: 11:20 AM  Total Visit Duration:  25 minutes    MERCEDES Miramontes 04/25/25

## 2025-04-21 ENCOUNTER — TELEPHONE (OUTPATIENT)
Dept: PSYCHIATRY | Facility: CLINIC | Age: 20
End: 2025-04-21

## 2025-04-25 ENCOUNTER — TELEMEDICINE (OUTPATIENT)
Dept: PSYCHIATRY | Facility: CLINIC | Age: 20
End: 2025-04-25
Payer: COMMERCIAL

## 2025-04-25 DIAGNOSIS — F43.23 ADJUSTMENT DISORDER WITH MIXED ANXIETY AND DEPRESSED MOOD: Primary | ICD-10-CM

## 2025-04-25 DIAGNOSIS — F64.9 GENDER DYSPHORIA: ICD-10-CM

## 2025-04-25 PROCEDURE — 99214 OFFICE O/P EST MOD 30 MIN: CPT

## 2025-04-25 RX ORDER — LISDEXAMFETAMINE DIMESYLATE 30 MG/1
CAPSULE ORAL
COMMUNITY
Start: 2025-04-14

## 2025-05-21 ENCOUNTER — SOCIAL WORK (OUTPATIENT)
Dept: BEHAVIORAL/MENTAL HEALTH CLINIC | Facility: CLINIC | Age: 20
End: 2025-05-21
Payer: COMMERCIAL

## 2025-05-21 DIAGNOSIS — F64.9 GENDER DYSPHORIA: ICD-10-CM

## 2025-05-21 DIAGNOSIS — F41.9 ANXIETY: Primary | ICD-10-CM

## 2025-05-21 PROCEDURE — 90834 PSYTX W PT 45 MINUTES: CPT | Performed by: COUNSELOR

## 2025-05-22 NOTE — PSYCH
"Behavioral Health Psychotherapy Progress Note    Psychotherapy Provided: Individual Psychotherapy     1. Anxiety        2. Gender dysphoria            Goals addressed in session: Goal 1     DATA: Client shared that she has decided to drop out of college after struggling this semester. Client shared that she had not yet told her parents, and is not planning to do so until after she officially drops out due to concerns that they will try to talk her out of it. Client shared that she plans to hand her parents a letter explaining her decision, due to feeling anxious. Client was not open to other ways of handling the situation due to feeling anxious. Therapist and client continued to explore client's gender identity. Client shared that she had purchased a bathing suit that is marketed for women, but is feeling anxious about wearing it in front of her family.    During this session, this clinician used the following therapeutic modalities: Client-centered Therapy, Cognitive Behavioral Therapy, Solution-Focused Therapy, and Supportive Psychotherapy    Substance Abuse was not addressed during this session. If the client is diagnosed with a co-occurring substance use disorder, please indicate any changes in the frequency or amount of use: NA. Stage of change for addressing substance use diagnoses: No substance use/Not applicable    ASSESSMENT:  Eladia Colunga presents with a Euthymic/ normal mood.     her affect is Normal range and intensity, which is congruent, with her mood and the content of the session. The client has made progress on their goals.     Eladia Colunga presents with a none risk of suicide, none risk of self-harm, and none risk of harm to others.    For any risk assessment that surpasses a \"low\" rating, a safety plan must be developed.    A safety plan was indicated: no  If yes, describe in detail NA    PLAN: Between sessions, Eladia Colunga will use her coping skills. At the next session, the therapist will use " Cognitive Behavioral Therapy to address anxiety.    Behavioral Health Treatment Plan and Discharge Planning: Eladia Colunga is aware of and agrees to continue to work on their treatment plan. They have identified and are working toward their discharge goals. yes    Depression Follow-up Plan Completed: No    Visit start and stop times:    05/21/25  Start Time: 1401  Stop Time: 1453  Total Visit Time: 52 minutes

## 2025-05-28 ENCOUNTER — SOCIAL WORK (OUTPATIENT)
Dept: BEHAVIORAL/MENTAL HEALTH CLINIC | Facility: CLINIC | Age: 20
End: 2025-05-28
Payer: COMMERCIAL

## 2025-05-28 DIAGNOSIS — F41.9 ANXIETY: Primary | ICD-10-CM

## 2025-05-28 PROCEDURE — 90834 PSYTX W PT 45 MINUTES: CPT | Performed by: COUNSELOR

## 2025-05-28 NOTE — PSYCH
"Behavioral Health Psychotherapy Progress Note    Psychotherapy Provided: Individual Psychotherapy     1. Anxiety            Goals addressed in session: Goal 1     DATA: Therapist and client continued to discuss client's decision to drop out of college, including her anxiety regarding telling her parents her decision. Client shard that she had told her sister her plans and that her sister had not taken the news well. Therapist and client discussed steps client can take towards not only telling her parents, but also towards helping her parents to feel more confident in client's decision. This includes developing a plan for the next few years. Client shared that she had not refilled her meds in time, and that while she has since refilled her hormones (she is out and is waiting for the refill to arrive), she has not yet called in a refill of her Vyvanse. Therapist and client discussed steps client can take to help her to take the steps towards calling in her refill.    During this session, this clinician used the following therapeutic modalities: Client-centered Therapy, Cognitive Behavioral Therapy, Mindfulness-based Strategies, Solution-Focused Therapy, and Supportive Psychotherapy    Substance Abuse was not addressed during this session. If the client is diagnosed with a co-occurring substance use disorder, please indicate any changes in the frequency or amount of use: NA. Stage of change for addressing substance use diagnoses: No substance use/Not applicable    ASSESSMENT:  Eladia Colunga presents with a Anxious mood.     her affect is Normal range and intensity, which is congruent, with her mood and the content of the session. The client has made progress on their goals.     Eladia Colunga presents with a none risk of suicide, none risk of self-harm, and none risk of harm to others.    For any risk assessment that surpasses a \"low\" rating, a safety plan must be developed.    A safety plan was indicated: no  If yes, " describe in detail NA    PLAN: Between sessions, Eladia Colunga will call in her refills. At the next session, the therapist will use Cognitive Behavioral Therapy to address anxiety.    Behavioral Health Treatment Plan and Discharge Planning: Eladia Colunga is aware of and agrees to continue to work on their treatment plan. They have identified and are working toward their discharge goals. yes    Depression Follow-up Plan Completed: No    Visit start and stop times:    05/28/25  Start Time: 1300  Stop Time: 1344  Total Visit Time: 44 minutes

## 2025-06-04 ENCOUNTER — TELEMEDICINE (OUTPATIENT)
Dept: BEHAVIORAL/MENTAL HEALTH CLINIC | Facility: CLINIC | Age: 20
End: 2025-06-04
Payer: COMMERCIAL

## 2025-06-04 DIAGNOSIS — F41.9 ANXIETY: Primary | ICD-10-CM

## 2025-06-04 PROCEDURE — 90834 PSYTX W PT 45 MINUTES: CPT | Performed by: COUNSELOR

## 2025-06-05 NOTE — PSYCH
"Virtual Regular VisitName: Tommy Colunga      : 2005      MRN: [id number]  Encounter Provider: JOVI Love  Encounter Date: 2025   Encounter department: [facility] THERAPIST MENTAL HEALTH OUTPATIENT  :  Assessment & Plan  Anxiety             Goals addressed in session: Goal 1     DATA: Therapist and client continued to discuss and process client's decision to not return to college. Client shared that she is anxious to tell her parents her decision, and feels that she would like to start her Vyvanse again so that she can focus prior to beginning the letter that she wants to write to her parents in order to tell them. Therapist suggested that client try writing bullet notes about what she would like to include in the letter, and pointed out that the struggle for client with writing the letter is likely emotional rather than focus based, which client agreed with. Therapist and client began discussing the letter that client would like to write to her parents.    During this session, this clinician used the following therapeutic modalities: Client-centered Therapy, Cognitive Behavioral Therapy, Solution-Focused Therapy, and Supportive Psychotherapy    Substance Abuse was not addressed during this session. If the client is diagnosed with a co-occurring substance use disorder, please indicate any changes in the frequency or amount of use: NA. Stage of change for addressing substance use diagnoses: No substance use/Not applicable    ASSESSMENT:  Tommy presents with a Euthymic/ normal and Anxious mood. Tommy's affect is Normal range and intensity, which is congruent, with their mood and the content of the session. The client has made progress on their goals as evidenced by reached out to get medication refilled.    Tommy presents with a none risk of suicide, none risk of self-harm, and none risk of harm to others.    For any risk assessment that surpasses a \"low\" rating, a safety plan must be " developed.    A safety plan was indicated: no  If yes, describe in detail NA    PLAN: Between sessions, Tommy will use her coping skills. At the next session, the therapist will use Cognitive Behavioral Therapy to address anxiety.    Behavioral Health Treatment Plan [facility]: Diagnosis and Treatment Plan explained to Tommy, Tommy relates understanding diagnosis and is agreeable to Treatment Plan. Yes     Depression Follow-up Plan Completed: No     Reason for visit is No chief complaint on file.     Recent Visits  Date Type Provider Dept   06/04/25 Telemedicine JOVI Love [facility] Therapist op   Showing recent visits within past 7 days and meeting all other requirements  Future Appointments  No visits were found meeting these conditions.  Showing future appointments within next 150 days and meeting all other requirements     History of Present Illness     HPI    Past Medical History   Past Medical History[1]  Past Surgical History[2]  Current Outpatient Medications   Medication Instructions    escitalopram (LEXAPRO) 20 mg, Oral, Daily    estradiol (ESTRACE) 2 mg, Oral    HYDROcodone-acetaminophen (NORCO) 5-325 mg per tablet TAKE 1 TABLET EVERY 6 HOURS AS NEEDED FOR SEVERE PAIN (SCALE 7-10)    lisdexamfetamine (VYVANSE) 20 MG capsule 1 capsule, Daily    lisdexamfetamine (VYVANSE) 30 MG capsule TAKE 1 CAPSULE BY MOUTH EVERY DAY **ONLY 28 PILLS, REMAINDER VOID**    Progesterone 100 mg, Oral    spironolactone (ALDACTONE) 50 mg, Oral     Allergies[3]    Objective   There were no vitals taken for this visit.    Video Exam  Physical Exam     Administrative Statements   Encounter provider JOVI Love    The Patient is located at Home and in the following state in which I hold an active license [place].    The patient was identified by name and date of birth. Tommy Colunga was informed that this is a telemedicine visit and that the visit is being conducted through the Epic Embedded platform. She agrees to proceed..   My office door was closed. No one else was in the room.  She acknowledged consent and understanding of privacy and security of the video platform. The patient has agreed to participate and understands they can discontinue the visit at any time.        Visit Time  Start Time: 1302  Stop Time: 1344  Total Visit Time: 42 minutes       [1] No past medical history on file.  [2] No past surgical history on file.  [3]   Allergies  Allergen Reactions    Other Other (See Comments)     NA

## 2025-06-11 ENCOUNTER — TELEMEDICINE (OUTPATIENT)
Dept: BEHAVIORAL/MENTAL HEALTH CLINIC | Facility: CLINIC | Age: 20
End: 2025-06-11
Payer: COMMERCIAL

## 2025-06-11 DIAGNOSIS — F41.9 ANXIETY: Primary | ICD-10-CM

## 2025-06-11 DIAGNOSIS — F64.9 GENDER DYSPHORIA: ICD-10-CM

## 2025-06-11 DIAGNOSIS — F43.23 ADJUSTMENT DISORDER WITH MIXED ANXIETY AND DEPRESSED MOOD: ICD-10-CM

## 2025-06-11 DIAGNOSIS — F32.A DEPRESSION, UNSPECIFIED DEPRESSION TYPE: ICD-10-CM

## 2025-06-11 PROCEDURE — 90834 PSYTX W PT 45 MINUTES: CPT | Performed by: COUNSELOR

## 2025-06-11 NOTE — BH CRISIS PLAN
"Client Name: Eladia Colunga       Client YOB: 2005    RafaelYoshi Safety Plan      Creation Date: 6/11/25 Update Date: 6/11/26   Created By: JOVI Love Last Updated By: JOVI Love      Step 1: Warning Signs:   Warning Signs   Not cleaning room   \"Messed up sleep schedule\"   not refilling meds            Step 2: Internal Coping Strategies:   Internal Coping Strategies   long boarding   rock climbing   any kind of physical activity   hang board   [employer]            Step 3: People and social settings that provide distraction:   Name Contact Information   Pauline in phone   Rick and Giancarlo in phone    Places   rock wall   Pauline's   [employer]           Step 4: People whom I can ask for help during a crisis:      Name Contact Information    Sister lives with    Rexy in phone    Miguelito in phone    Pauline in phone      Step 5: Professionals or agencies I can contact during a crisis:      Clinican/Agency Name Phone Emergency Contact    Dorene Monge [phone]       Local Emergency Department Emergency Department Phone Emergency Department Address    [facility] [phone] [address] [facility] [facility] , [place], [address]        Crisis Phone Numbers:   Suicide Prevention Lifeline: Call or Text  980 Crisis Text Line: Text HOME to 110-322   Please note: Some [facility] do not have a separate number for Child/Adolescent specific crisis. If your county is not listed under Child/Adolescent, please call the adult number for your county      Adult Crisis Numbers: Child/Adolescent Crisis Numbers   [facility]: [phone] [facility]: [phone]   [facility]: [phone] [facility]: [phone]   [facility]: [phone] [place]: [phone]   [facility]: [phone] [place] [facility]: [phone]   [place] [facility]: [phone]   [facility]: [phone]   [facility]: [phone]   [facility]: [phone] (daytime) [phone] " "(after hours, weekends, holidays)      Step 6: Making the environment safer (plan for lethal means safety):   Plan: Dad has a pistol, client doesn't know where it is or \"anything about it, he just told me he has one.\"     Optional: What is most important to me and worth living for?   Fully transitioning, dog, family, friends (Gina Fan), [place] Safety Plan. Marivel Hall and Antoni Belle. Used with permission of the authors.           "

## 2025-06-11 NOTE — PSYCH
"Virtual Regular VisitName: Tommy Colunga      : 2005      MRN: [id number]  Encounter Provider: JOVI Love  Encounter Date: 2025   Encounter department: [facility] THERAPIST MENTAL HEALTH OUTPATIENT  :  Assessment & Plan  Anxiety         Depression, unspecified depression type         Gender dysphoria         Adjustment disorder with mixed anxiety and depressed mood             Goals addressed in session: Goal 1     DATA: Therapist and client continued to discuss the letter client plans to write to tell her parents that she has decided to drop out of college. Client did not make a list of the bullet points that she wants to include in the letter between appointments as previously discussed. Therapist and client discussed what may help client to achieve this. Therapist and client updated client's safety plan.    During this session, this clinician used the following therapeutic modalities: Engagement Strategies, Client-centered Therapy, Cognitive Behavioral Therapy, Solution-Focused Therapy, and Supportive Psychotherapy    Substance Abuse was not addressed during this session. If the client is diagnosed with a co-occurring substance use disorder, please indicate any changes in the frequency or amount of use: NA. Stage of change for addressing substance use diagnoses: No substance use/Not applicable    ASSESSMENT:  Tommy presents with a Euthymic/ normal mood. Tommy's affect is Normal range and intensity, which is congruent, with their mood and the content of the session. The client has made progress on their goals as evidenced by filling Vyvanse script.    Tommy presents with a none risk of suicide, none risk of self-harm, and none risk of harm to others.    For any risk assessment that surpasses a \"low\" rating, a safety plan must be developed.    A safety plan was indicated: no  If yes, describe in detail NA    PLAN: Between sessions, Tommy will use her coping skills. At the next " session, the therapist will use Cognitive Behavioral Therapy to address anxiety.    Behavioral Health Treatment Plan [facility]: Diagnosis and Treatment Plan explained to Tommy Sanders relates understanding diagnosis and is agreeable to Treatment Plan. Yes     Depression Follow-up Plan Completed: No     Reason for visit is No chief complaint on file.     Recent Visits  Date Type Provider Dept   06/04/25 Telemedicine JOVI Love [facility] Therapist [facility]   Showing recent visits within past 7 days and meeting all other requirements  Today's Visits  Date Type Provider Dept   06/11/25 Telemedicine JOVI Love [facility] Therapist [facility]   Showing today's visits and meeting all other requirements  Future Appointments  No visits were found meeting these conditions.  Showing future appointments within next 150 days and meeting all other requirements     History of Present Illness     HPI    Past Medical History   Past Medical History[1]  Past Surgical History[2]  Current Outpatient Medications   Medication Instructions    escitalopram (LEXAPRO) 20 mg, Oral, Daily    estradiol (ESTRACE) 2 mg, Oral    HYDROcodone-acetaminophen (NORCO) 5-325 mg per tablet TAKE 1 TABLET EVERY 6 HOURS AS NEEDED FOR SEVERE PAIN (SCALE 7-10)    lisdexamfetamine (VYVANSE) 20 MG capsule 1 capsule, Daily    lisdexamfetamine (VYVANSE) 30 MG capsule TAKE 1 CAPSULE BY MOUTH EVERY DAY **ONLY 28 PILLS, REMAINDER VOID**    Progesterone 100 mg, Oral    spironolactone (ALDACTONE) 50 mg, Oral     Allergies[3]    Objective   There were no vitals taken for this visit.    Video Exam  Physical Exam     Administrative Statements   Encounter provider JOVI Love    The Patient is located at Home and in the following state in which I hold an active license [place].    The patient was identified by name and date of birth. Tommy Colunga was informed that this is a telemedicine visit and that the visit is being conducted through the Epic Embedded  platform. She agrees to proceed..  My office door was closed. No one else was in the room.  She acknowledged consent and understanding of privacy and security of the video platform. The patient has agreed to participate and understands they can discontinue the visit at any time.        Visit Time  Start Time: 1302  Stop Time: 1341  Total Visit Time: 39 minutes         [1] No past medical history on file.  [2] No past surgical history on file.  [3]   Allergies  Allergen Reactions    Other Other (See Comments)     NA

## 2025-06-18 ENCOUNTER — TELEMEDICINE (OUTPATIENT)
Dept: BEHAVIORAL/MENTAL HEALTH CLINIC | Facility: CLINIC | Age: 20
End: 2025-06-18
Payer: COMMERCIAL

## 2025-06-18 DIAGNOSIS — F41.9 ANXIETY: Primary | ICD-10-CM

## 2025-06-18 DIAGNOSIS — F64.9 GENDER DYSPHORIA: ICD-10-CM

## 2025-06-18 PROCEDURE — 90834 PSYTX W PT 45 MINUTES: CPT | Performed by: COUNSELOR

## 2025-06-18 NOTE — PSYCH
"Virtual Regular VisitName: Tommy Colunga      : 2005      MRN: [id number]  Encounter Provider: JOVI Love  Encounter Date: 2025   Encounter department: [facility] THERAPIST MENTAL HEALTH OUTPATIENT  :  Assessment & Plan  Anxiety         Gender dysphoria             Goals addressed in session: Goal 1     DATA: Client shared that she had recently had an argument with her girlfriend and that her girlfriend had later sent a text that sounded like she wanted to breakup with client, which caused client anxiety and then client stayed up late because she was feeling anxious. Therapist and client discussed how client can establish healthy boundaries with her girlfriend and express her needs, while still being empathetic to her girlfriend's side. Therapist and client continued to process client's anxiety around telling her parents that she plans to drop out of college.    During this session, this clinician used the following therapeutic modalities: Client-centered Therapy, Cognitive Behavioral Therapy, Solution-Focused Therapy, and Supportive Psychotherapy    Substance Abuse was not addressed during this session. If the client is diagnosed with a co-occurring substance use disorder, please indicate any changes in the frequency or amount of use: NA. Stage of change for addressing substance use diagnoses: No substance use/Not applicable    ASSESSMENT:  Tommy presents with a Euthymic/ normal and Anxious mood. Tommy's affect is Normal range and intensity, which is congruent, with their mood and the content of the session. The client has made progress on their goals as evidenced by being productive the day prior to this session.    Tommy presents with a none risk of suicide, none risk of self-harm, and none risk of harm to others.    For any risk assessment that surpasses a \"low\" rating, a safety plan must be developed.    A safety plan was indicated: no  If yes, describe in detail NA    PLAN: " Between sessions, Tommy will use her coping skills. At the next session, the therapist will use Cognitive Behavioral Therapy to address anxiety.    Behavioral Health Treatment Plan [facility]: Diagnosis and Treatment Plan explained to Tommy Sanders relates understanding diagnosis and is agreeable to Treatment Plan. Yes     Depression Follow-up Plan Completed: No     Reason for visit is No chief complaint on file.     Recent Visits  Date Type Provider Dept   06/11/25 Telemedicine JOVI Love [facility] Therapist [facility]   Showing recent visits within past 7 days and meeting all other requirements  Today's Visits  Date Type Provider Dept   06/18/25 Telemedicine JOVI Love [facility] Therapist [facility]   Showing today's visits and meeting all other requirements  Future Appointments  No visits were found meeting these conditions.  Showing future appointments within next 150 days and meeting all other requirements     History of Present Illness     HPI    Past Medical History   Past Medical History[1]  Past Surgical History[2]  Current Outpatient Medications   Medication Instructions    escitalopram (LEXAPRO) 20 mg, Oral, Daily    estradiol (ESTRACE) 2 mg, Oral    HYDROcodone-acetaminophen (NORCO) 5-325 mg per tablet TAKE 1 TABLET EVERY 6 HOURS AS NEEDED FOR SEVERE PAIN (SCALE 7-10)    lisdexamfetamine (VYVANSE) 20 MG capsule 1 capsule, Daily    lisdexamfetamine (VYVANSE) 30 MG capsule TAKE 1 CAPSULE BY MOUTH EVERY DAY **ONLY 28 PILLS, REMAINDER VOID**    Progesterone 100 mg, Oral    spironolactone (ALDACTONE) 50 mg, Oral     Allergies[3]    Objective   There were no vitals taken for this visit.    Video Exam  Physical Exam     Administrative Statements   Encounter provider JOVI Love    The Patient is located at Home and in the following state in which I hold an active license [place].    The patient was identified by name and date of birth. Tommy Colunga was informed that this is a telemedicine visit and  that the visit is being conducted through the Epic Embedded platform. She agrees to proceed..  My office door was closed. No one else was in the room.  She acknowledged consent and understanding of privacy and security of the video platform. The patient has agreed to participate and understands they can discontinue the visit at any time.        Visit Time  Start Time: 1300  Stop Time: 1342  Total Visit Time: 42 minutes       [1] No past medical history on file.  [2] No past surgical history on file.  [3]   Allergies  Allergen Reactions    Other Other (See Comments)     NA

## 2025-06-25 PROBLEM — Z91.199 NO-SHOW FOR APPOINTMENT: Status: ACTIVE | Noted: 2025-06-25

## 2025-07-02 ENCOUNTER — TELEMEDICINE (OUTPATIENT)
Dept: BEHAVIORAL/MENTAL HEALTH CLINIC | Facility: CLINIC | Age: 20
End: 2025-07-02
Payer: COMMERCIAL

## 2025-07-02 DIAGNOSIS — F41.9 ANXIETY: Primary | ICD-10-CM

## 2025-07-02 PROCEDURE — 90834 PSYTX W PT 45 MINUTES: CPT | Performed by: COUNSELOR

## 2025-07-02 NOTE — PSYCH
Virtual Regular VisitName: Tommy Colunga      : 2005      MRN: [id number]  Encounter Provider: JOVI Love  Encounter Date: 2025   Encounter department: [facility] THERAPIST MENTAL HEALTH OUTPATIENT  :  Assessment & Plan  Anxiety             Goals addressed in session: Goal 1     DATA: Client shared that she had told her parents (via letter) that she planned to drop out of college. Therapist verbally reinforced client opening up to her parents and taking the step of telling them. Client also shared that she had filled out the paperwork with [employer] and had officially dropped out, and had begun telling her friends. Therapist verbally reinforced client taking these challenging steps. Therapist and client discussed an incident where client had felt anxious to meet her girlfriend's friend and while she had felt tired beforehand, she became more tired and had laid down at this friend's house and ended up missing a concert because she was so tired. Therapist provided psycho-education on anxiety, and therapist and client explored what fear may have been under client's anxiety and how that may have led to feeling very tired. Therapist and client practiced ways to challenge these anxious thoughts.    During this session, this clinician used the following therapeutic modalities: Client-centered Therapy, Cognitive Behavioral Therapy, Solution-Focused Therapy, and Supportive Psychotherapy    Substance Abuse was not addressed during this session. If the client is diagnosed with a co-occurring substance use disorder, please indicate any changes in the frequency or amount of use: NA. Stage of change for addressing substance use diagnoses: No substance use/Not applicable    ASSESSMENT:  Tommy presents with a Euthymic/ normal mood. Tommy's affect is Normal range and intensity, which is congruent, with their mood and the content of the session. The client has made progress on their goals as evidenced by  "telling her parents about college.    Tommy presents with a none risk of suicide, none risk of self-harm, and none risk of harm to others.    For any risk assessment that surpasses a \"low\" rating, a safety plan must be developed.    A safety plan was indicated: no  If yes, describe in detail NA    PLAN: Between sessions, Tommy will use her coping skills. At the next session, the therapist will use Cognitive Behavioral Therapy to address anxiety.    Behavioral Health Treatment Plan [facility]: Diagnosis and Treatment Plan explained to Tommy, Tommy relates understanding diagnosis and is agreeable to Treatment Plan. Yes     Depression Follow-up Plan Completed: No     Reason for visit is No chief complaint on file.     Recent Visits  No visits were found meeting these conditions.  Showing recent visits within past 7 days and meeting all other requirements  Today's Visits  Date Type Provider Dept   07/02/25 Telemedicine JOVI Love [facility] Therapist op   Showing today's visits and meeting all other requirements  Future Appointments  No visits were found meeting these conditions.  Showing future appointments within next 150 days and meeting all other requirements     History of Present Illness     HPI    Past Medical History   Past Medical History[1]  Past Surgical History[2]  Current Outpatient Medications   Medication Instructions    escitalopram (LEXAPRO) 20 mg, Oral, Daily    estradiol (ESTRACE) 2 mg, Oral    HYDROcodone-acetaminophen (NORCO) 5-325 mg per tablet TAKE 1 TABLET EVERY 6 HOURS AS NEEDED FOR SEVERE PAIN (SCALE 7-10)    lisdexamfetamine (VYVANSE) 20 MG capsule 1 capsule, Daily    lisdexamfetamine (VYVANSE) 30 MG capsule TAKE 1 CAPSULE BY MOUTH EVERY DAY **ONLY 28 PILLS, REMAINDER VOID**    Progesterone 100 mg, Oral    spironolactone (ALDACTONE) 50 mg, Oral     Allergies[3]    Objective   There were no vitals taken for this visit.    Video Exam  Physical Exam     Administrative Statements "   Encounter provider JOVI Love    The Patient is located at Home and in the following state in which I hold an active license [place].    The patient was identified by name and date of birth. Tommy Colunga was informed that this is a telemedicine visit and that the visit is being conducted through the Epic Embedded platform. She agrees to proceed..  My office door was closed. No one else was in the room.  She acknowledged consent and understanding of privacy and security of the video platform. The patient has agreed to participate and understands they can discontinue the visit at any time.      Visit Time  Start Time: 1303  Stop Time: 1349  Total Visit Time: 46 minutes       [1] No past medical history on file.  [2] No past surgical history on file.  [3]   Allergies  Allergen Reactions    Other Other (See Comments)     NA

## 2025-07-17 ENCOUNTER — TELEMEDICINE (OUTPATIENT)
Dept: BEHAVIORAL/MENTAL HEALTH CLINIC | Facility: CLINIC | Age: 20
End: 2025-07-17
Payer: COMMERCIAL

## 2025-07-17 DIAGNOSIS — F41.9 ANXIETY: Primary | ICD-10-CM

## 2025-07-17 DIAGNOSIS — F32.A DEPRESSION, UNSPECIFIED DEPRESSION TYPE: ICD-10-CM

## 2025-07-17 PROCEDURE — 90834 PSYTX W PT 45 MINUTES: CPT | Performed by: COUNSELOR

## 2025-07-17 NOTE — PSYCH
MEDICATION MANAGEMENT NOTE    Name: Tommy Colunga      : 2005      MRN: [id number]  Encounter Provider: MERCEDES Miramontes  Encounter Date: 2025   Encounter department: [facility] OUTPATIENT    Insurance: Payor: [employer] / Plan: [employer] / Product Type: PPO Commercial /      Reason for Visit: Follow-up for medication management:  Assessment & Plan  Anxiety   -Continue Lexapro 20 mg daily for anxiety and depression       Depression, unspecified depression type   -Continue Lexapro 20 mg daily for anxiety and depression       Attention deficit hyperactivity disorder (ADHD), unspecified ADHD type   -Continue Vyvanse 30 mg daily for ADHD  Orders:    lisdexamfetamine (VYVANSE) 30 MG capsule; Take 1 capsule (30 mg total) by mouth every morning Max Daily Amount: 30 mg    lisdexamfetamine (Vyvanse) 30 MG capsule; Take 1 capsule (30 mg total) by mouth every morning Max Daily Amount: 30 mg Do not start before 2025.    lisdexamfetamine (Vyvanse) 30 MG capsule; Take 1 capsule (30 mg total) by mouth every morning Max Daily Amount: 30 mg Do not start before 2025.    Impression:  Depression  Anxiety  ADHD     Continue Lexapro 20 mg daily for anxiety and depression  Continue Vyvanse 30 mg daily for ADHD- PDMP reviewed- last filled 2025  Recommend to continue outpatient therapy at [facility]  Medical follow up with PCP as needed  Aware of 24 hour and weekend coverage for urgent situations accessed by calling [facility] Outpatient main practice number  Follow up in 3 months      Treatment Plan: Next treatment plan due 2026.     Treatment Recommendations:    Educated about diagnosis and treatment modalities. Verbalizes understanding and agreement with the treatment plan.  Discussed self monitoring of symptoms, and symptom monitoring tools.  Discussed medications and if treatment  "adjustment was needed or desired.  Aware of 24 hour and weekend coverage for urgent situations accessed by calling [facility] main practice number  I am scheduling this patient out for greater than 3 months: No    Medications Risks/Benefits:      Risks, Benefits And Possible Side Effects Of Medications:    Risks, benefits, and possible side effects of medications explained to Eladia and she (or legal representative) verbalizes understanding and agreement for treatment.    Controlled Medication Discussion:     Eladia has been filling controlled prescriptions on time as prescribed according to [place] Prescription Drug Monitoring Program.  Discussed with Eladia the risks of impairment of ability to drive and potential for abuse and addiction related to treatment with stimulant medications. She understands risk of treatment with stimulant medications, agrees to not drive if feels impaired and agrees to take medications as prescribed.  Eladia is using medication appropriately.      History of Present Illness     HPI   Eladia is a 19 y.o. adult  (prefers pronouns She/Her) with a history of Adjustment Disorder, with mixed anxiety and depressed mood, who presents for follow-up for medication management.  Currently being observed on Lexapro 20 mg daily and Vyvanse 30 mg daily .  Eladia is currently connected to outpatient therapy with Dorene Monge at [facility]. No additional services at this time.     Eladia reports medication compliance and denies any side effects at this time.  She recently told her parents that she is dropping out of school at [employer] which has been a relief for her.  It is going on 9 months that she is been with Yvette and it is going well.  Eladia states her mood is \"good\".  Sleep and appetite remain adequate.  Energy levels and motivation have been good.  She denies SI, HI, AH, or VH.  She does not endorse any mood swings or irritability.  " Denies any periods of elevated moods or isac.  No anxiety or depression reported today.  Eladia is currently on Vyvanse 30 mg and is requesting this provider to take over the prescription for ADHD control.  ADHD symptoms well-controlled on the 30 mg of Vyvanse.  Provider agreeable. Eladia reports work is going great and she is actually helping build an escape room.  No medication changes at this time.    Review Of Systems: A review of systems is obtained and is negative except for the pertinent positives listed in HPI/Subjective above.      Current Rating Scores:     None completed today.    Areas of Improvement: reviewed in HPI/Subjective Section and reviewed in Assessment and Plan Section    No past medical history on file.  No past surgical history on file.  Allergies:   Allergies   Allergen Reactions    Other Other (See Comments)     NA       Current Outpatient Medications   Medication Instructions    escitalopram (LEXAPRO) 20 mg, Oral, Daily    estradiol (ESTRACE) 2 mg, Oral    HYDROcodone-acetaminophen (NORCO) 5-325 mg per tablet TAKE 1 TABLET EVERY 6 HOURS AS NEEDED FOR SEVERE PAIN (SCALE 7-10)    lisdexamfetamine (VYVANSE) 20 MG capsule 1 capsule, Daily    lisdexamfetamine (VYVANSE) 30 MG capsule TAKE 1 CAPSULE BY MOUTH EVERY DAY **ONLY 28 PILLS, REMAINDER VOID**    Progesterone 100 mg, Oral    spironolactone (ALDACTONE) 50 mg, Oral        Substance Abuse History:    Tobacco, Alcohol and Drug Use History     Tobacco Use    Smoking status: Never    Smokeless tobacco: Never   Substance Use Topics    Alcohol use: Not on file    Drug use: Not on file     Alcohol Use: Not At Risk (4/11/2024)    Received from [employer]    AUDIT-C     Q1: How often do you have a drink containing alcohol?: Never     Q2: How many drinks containing alcohol do you have on a typical day when you are drinking?: Patient does not drink     Q3: How often do you have six or more drinks on one occasion?: Never       Social  "History:    Social History     Socioeconomic History    Marital status: Single     Spouse name: Not on file    Number of children: Not on file    Years of education: Not on file    Highest education level: Not on file   Occupational History    Not on file   Other Topics Concern    Not on file   Social History Narrative    Not on file        Family Psychiatric History:     No family history on file.    Medical History Reviewed by provider this encounter:          Objective   There were no vitals taken for this visit.     Mental Status Evaluation:    Appearance age appropriate, casually dressed   Behavior cooperative, calm   Speech normal rate, normal volume, normal pitch, spontaneous   Mood euthymic   Affect normal range and intensity, appropriate   Thought Processes organized, goal directed   Thought Content no overt delusions   Perceptual Disturbances: no auditory hallucinations, no visual hallucinations   Abnormal Thoughts  Risk Potential Suicidal ideation - None  Homicidal ideation - None  Potential for aggression - No   Orientation oriented to person, place, time/date, and situation   Memory recent and remote memory grossly intact   Consciousness alert and awake   Attention Span Concentration Span attention span and concentration are age appropriate   Intellect appears to be of average intelligence   Insight intact   Judgement intact   Muscle Strength and  Gait unable to assess today due to virtual visit   Motor activity no abnormal movements   Language no difficulty naming common objects, no difficulty repeating a phrase, no difficulty writing a sentence   Fund of Knowledge adequate knowledge of current events  adequate fund of knowledge regarding past history  adequate fund of knowledge regarding vocabulary    Pain none   Pain Scale Not formally asked to rate        Laboratory Results: I have personally reviewed all pertinent laboratory/tests results    Most Recent Labs: No results found for: \"WBC\", \"RBC\", " "\"HGB\", \"HCT\", \"PLT\", \"RDW\", \"NEUTROABS\", \"NA\", \"SODIUM\", \"K\", \"CL\", \"CO2\", \"BUN\", \"CREATININE\", \"GLUCOSE\", \"CALCIUM\", \"AST\", \"ALT\", \"ALKPHOS\", \"PROT\", \"TP\", \"BILITOT\", \"TBILI\", \"CHOL\", \"CHOLESTEROL\", \"TRIG\", \"HDL\", \"LDLCALC\", \"NONHDLC\", \"VALPROICTOT\", \"CARBAMAZEPIN\", \"LITHIUM\", \"AMMONIA\", \"BTR1EPBLJFXS\", \"FREET4\", \"T3FREE\", \"PREGTESTUR\", \"PREGSERUM\", \"HCG\", \"HCGQUANT\", \"RPR\", \"TREPONEMAPA\"    Suicide/Homicide Risk Assessment:    Risk of Harm to Self:  The following ratings are based on assessment at the time of the interview  Historical Risk Factors include: history of depression, history of anxiety  Protective Factors: no current suicidal ideation, ability to adapt to change, able to make plans for the future, access to mental health treatment, compliant with medications, compliant with mental health treatment    Risk of Harm to Others:  The following ratings are based on assessment at the time of the interview  Historical Risk Factors include: none  Protective Factors: no current homicidal ideation, ability to adapt to change, able to manage anger well, access to mental health treatment, compliant with medications, compliant with mental health treatment    The following interventions are recommended: Continue medication management. No other intervention changes indicated at this time.    Psychotherapy Provided:     Individual psychotherapy provided: No    Treatment Plan:    Completed and signed during the session: Yes - Treatment Plan done but not signed at time of office visit due to: Plan reviewed by video and verbal consent given due to virtual visit. Treatment Plan sent to patient via [employer] for signature.     Goals: Progress towards Treatment Plan goals - Yes, progressing, as evidenced by subjective findings in HPI/Subjective Section and in Assessment and Plan Section    Depression Follow-up Plan Completed: Yes    Note Share:    This note was not shared with the patient due to reasonable likelihood of " causing patient harm      Administrative Statements     Encounter provider MERCEDES Miramontes    The Patient is located at Home and in the following state in which I hold an active license [place].    The patient was identified by name and date of birth. Tommy Colunga was informed that this is a telemedicine visit and that the visit is being conducted through the Epic Embedded platform. She agrees to proceed..  My office door was closed. No one else was in the room.  She acknowledged consent and understanding of privacy and security of the video platform. The patient has agreed to participate and understands they can discontinue the visit at any time.    I have spent a total time of 14 minutes in caring for this patient on the day of the visit/encounter including Risks and benefits of tx options, Instructions for management, Patient and family education, Importance of tx compliance, Documenting in the medical record, Reviewing/placing orders in the medical record (including tests, medications, and/or procedures), and Obtaining or reviewing history  , not including the time spent for establishing the audio/video connection.    Visit Time  Visit Start Time: 11:00 AM  Visit Stop Time: 11:14 AM  Total Visit Duration: 14 minutes    MERCEDES Miramontes 07/17/25
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
## 2025-07-23 ENCOUNTER — TELEMEDICINE (OUTPATIENT)
Dept: BEHAVIORAL/MENTAL HEALTH CLINIC | Facility: CLINIC | Age: 20
End: 2025-07-23
Payer: COMMERCIAL

## 2025-07-23 DIAGNOSIS — F41.9 ANXIETY: Primary | ICD-10-CM

## 2025-07-23 DIAGNOSIS — F64.9 GENDER DYSPHORIA: ICD-10-CM

## 2025-07-23 PROCEDURE — 90834 PSYTX W PT 45 MINUTES: CPT | Performed by: COUNSELOR
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
## 2025-07-25 ENCOUNTER — TELEMEDICINE (OUTPATIENT)
Dept: PSYCHIATRY | Facility: CLINIC | Age: 20
End: 2025-07-25
Payer: COMMERCIAL

## 2025-07-25 DIAGNOSIS — F41.9 ANXIETY: Primary | ICD-10-CM

## 2025-07-25 DIAGNOSIS — F32.A DEPRESSION, UNSPECIFIED DEPRESSION TYPE: ICD-10-CM

## 2025-07-25 DIAGNOSIS — F90.9 ATTENTION DEFICIT HYPERACTIVITY DISORDER (ADHD), UNSPECIFIED ADHD TYPE: ICD-10-CM

## 2025-07-25 PROCEDURE — 99214 OFFICE O/P EST MOD 30 MIN: CPT

## 2025-07-25 RX ORDER — LISDEXAMFETAMINE DIMESYLATE 30 MG/1
30 CAPSULE ORAL EVERY MORNING
Qty: 30 CAPSULE | Refills: 0 | Status: SHIPPED | OUTPATIENT
Start: 2025-09-19

## 2025-07-25 RX ORDER — LISDEXAMFETAMINE DIMESYLATE 30 MG/1
30 CAPSULE ORAL EVERY MORNING
Qty: 30 CAPSULE | Refills: 0 | Status: SHIPPED | OUTPATIENT
Start: 2025-08-22

## 2025-07-25 RX ORDER — LISDEXAMFETAMINE DIMESYLATE 30 MG/1
30 CAPSULE ORAL EVERY MORNING
Qty: 30 CAPSULE | Refills: 0 | Status: SHIPPED | OUTPATIENT
Start: 2025-07-25

## 2025-07-25 NOTE — BH TREATMENT PLAN
TREATMENT PLAN (Medication Management Only)        [facility]    Name and Date of Birth:  Tommy Colunga 19 y.o. 2005  MRN: [id number]  Date of Treatment Plan: July 25, 2025  Diagnosis/Diagnoses:  anxiety/depression  Strengths/Personal Resources for Self-Care: supportive friends, taking medications as prescribed, ability to adapt to life changes, ability to communicate needs, ability to communicate well, ability to listen, ability to reason, ability to understand psychiatric illness, average or above intelligence.  Area/Areas of need (in own words): anxiety, depression  1. Long Term Goal:   continue improvement in anxiety, continue improvement in depression.  Target Date:6 months - 1/25/2026  Person/Persons responsible for completion of goal: Tommy  2.  Short Term Objective (s) - How will we reach this goal?:   A.  Provider new recommended medication/dosage changes and/or continue medication(s): continue current medications as prescribed.  B.  Maintain medication compliance.  C.  Attend follow up appointments as scheduled.  Target Date:6 months - 1/25/2026  Person/Persons Responsible for Completion of Goal: Tommy  Progress Towards Goals: progressing  Treatment Modality: medication management every 3 months  Review due 180 days from date of this plan: 6 months - 1/25/2026  Expected length of service: ongoing treatment unless revised  My Physician/PA/NP and I have developed this plan together and I agree to work on the goals and objectives. I understand the treatment goals that were developed for my treatment.   Electronic Signatures: on file (unless signed below)    MERCEDES Miramontes 07/25/25

## 2025-07-25 NOTE — ASSESSMENT & PLAN NOTE
-Continue Vyvanse 30 mg daily for ADHD  Orders:    lisdexamfetamine (VYVANSE) 30 MG capsule; Take 1 capsule (30 mg total) by mouth every morning Max Daily Amount: 30 mg    lisdexamfetamine (Vyvanse) 30 MG capsule; Take 1 capsule (30 mg total) by mouth every morning Max Daily Amount: 30 mg Do not start before August 22, 2025.    lisdexamfetamine (Vyvanse) 30 MG capsule; Take 1 capsule (30 mg total) by mouth every morning Max Daily Amount: 30 mg Do not start before September 19, 2025.    Impression:

## 2025-07-30 ENCOUNTER — TELEMEDICINE (OUTPATIENT)
Dept: BEHAVIORAL/MENTAL HEALTH CLINIC | Facility: CLINIC | Age: 20
End: 2025-07-30
Payer: COMMERCIAL

## 2025-07-30 DIAGNOSIS — F32.A ANXIETY AND DEPRESSION: Primary | ICD-10-CM

## 2025-07-30 DIAGNOSIS — F90.9 ATTENTION DEFICIT HYPERACTIVITY DISORDER (ADHD), UNSPECIFIED ADHD TYPE: ICD-10-CM

## 2025-07-30 DIAGNOSIS — F64.9 GENDER DYSPHORIA: ICD-10-CM

## 2025-07-30 DIAGNOSIS — F41.9 ANXIETY AND DEPRESSION: Primary | ICD-10-CM

## 2025-07-30 PROCEDURE — 90834 PSYTX W PT 45 MINUTES: CPT | Performed by: COUNSELOR

## 2025-08-06 ENCOUNTER — TELEMEDICINE (OUTPATIENT)
Dept: BEHAVIORAL/MENTAL HEALTH CLINIC | Facility: CLINIC | Age: 20
End: 2025-08-06
Payer: COMMERCIAL

## 2025-08-06 ENCOUNTER — TELEPHONE (OUTPATIENT)
Dept: PSYCHIATRY | Facility: CLINIC | Age: 20
End: 2025-08-06

## 2025-08-06 DIAGNOSIS — F64.9 GENDER DYSPHORIA: ICD-10-CM

## 2025-08-06 DIAGNOSIS — F41.9 ANXIETY: Primary | ICD-10-CM

## 2025-08-06 PROCEDURE — 90834 PSYTX W PT 45 MINUTES: CPT | Performed by: COUNSELOR

## 2025-08-20 ENCOUNTER — TELEMEDICINE (OUTPATIENT)
Dept: BEHAVIORAL/MENTAL HEALTH CLINIC | Facility: CLINIC | Age: 20
End: 2025-08-20
Payer: COMMERCIAL

## 2025-08-20 DIAGNOSIS — F41.9 ANXIETY: Primary | ICD-10-CM

## 2025-08-20 DIAGNOSIS — F64.9 GENDER DYSPHORIA: ICD-10-CM

## 2025-08-20 PROCEDURE — 90834 PSYTX W PT 45 MINUTES: CPT | Performed by: COUNSELOR
